# Patient Record
Sex: FEMALE | Race: OTHER | Employment: UNEMPLOYED | URBAN - METROPOLITAN AREA
[De-identification: names, ages, dates, MRNs, and addresses within clinical notes are randomized per-mention and may not be internally consistent; named-entity substitution may affect disease eponyms.]

---

## 2022-01-13 ENCOUNTER — PATIENT MESSAGE (OUTPATIENT)
Dept: INTERNAL MEDICINE CLINIC | Facility: CLINIC | Age: 52
End: 2022-01-13

## 2022-01-13 ENCOUNTER — TELEMEDICINE (OUTPATIENT)
Dept: INTERNAL MEDICINE CLINIC | Facility: CLINIC | Age: 52
End: 2022-01-13
Payer: COMMERCIAL

## 2022-01-13 DIAGNOSIS — B34.9 VIRAL SYNDROME: Primary | ICD-10-CM

## 2022-01-13 PROCEDURE — 99203 OFFICE O/P NEW LOW 30 MIN: CPT | Performed by: FAMILY MEDICINE

## 2022-01-13 NOTE — ASSESSMENT & PLAN NOTE
Symptoms consistent with a viral syndrome, possibly COVID. COVID precautions discussed. COVID test ordered. Work note provided. Stay well hydrated. Can try warm water with honey  Take tylenol or ibuprofen as needed. Can use lozenges as needed.   If w

## 2022-01-13 NOTE — PROGRESS NOTES
Telehealth outside of [address] Verbal Consent   I conducted a telehealth visit with Kan Lima today, 01/13/22, which was completed using two-way, real-time interactive audio and video communication.  This has been done in [place] to [employer] cold and flu  -has gotten 2 COVID vaccine      ROS  GENERAL: + fever/chills, no recent weight loss  HEENT: No visual changes, no changes in hearing, no sore throats  NECK: No pain, no swelling  RESP: +cough, no SOB  CV: No chest pain, no palpitations  GI: file  Housing Stability: Not on file    PAST SURGICAL HISTORY  History reviewed. No pertinent surgical history.     PAST FAMILY HISTORY  Family History   Problem Relation Age of Onset   • Dementia Mother    • Dementia Father          PHYSICAL EXAM  There we minutes  Referring/communicatin minutes  Care coordination: 0 minutes    My total time spent caring for the patient on the day of the encounter: 22 minutes

## 2022-01-14 ENCOUNTER — NURSE ONLY (OUTPATIENT)
Dept: LAB | Age: 52
End: 2022-01-14
Attending: FAMILY MEDICINE
Payer: COMMERCIAL

## 2022-01-14 DIAGNOSIS — B34.9 VIRAL SYNDROME: ICD-10-CM

## 2022-01-17 LAB — SARS-COV-2 RNA RESP QL NAA+PROBE: DETECTED

## 2022-01-25 ENCOUNTER — TELEMEDICINE (OUTPATIENT)
Dept: INTERNAL MEDICINE CLINIC | Facility: CLINIC | Age: 52
End: 2022-01-25

## 2022-01-25 ENCOUNTER — TELEPHONE (OUTPATIENT)
Dept: INTERNAL MEDICINE CLINIC | Facility: CLINIC | Age: 52
End: 2022-01-25

## 2022-01-25 DIAGNOSIS — U07.1 COVID: Primary | ICD-10-CM

## 2022-01-25 PROCEDURE — 99213 OFFICE O/P EST LOW 20 MIN: CPT | Performed by: FAMILY MEDICINE

## 2022-01-25 NOTE — ASSESSMENT & PLAN NOTE
Patient with recent diagnosis of COVID, overall feeling better. Today slightly more nauseated and with headache. Given recent diagnosis of COVID, can hold off on repeat COVID testing.   Has had over 10 days since start of COVID symptoms and in general is

## 2022-01-25 NOTE — PROGRESS NOTES
Telehealth outside of [address] Verbal Consent   I conducted a telehealth visit with Roland Muller today, 01/25/22, which was completed using two-way, real-time interactive audio and video communication.  This has been done in [place] to [employer] and flu  -has gotten 2 COVID vaccine    1/25/22  -COVID positive 1/14/22  -pt reports she is slightly nauseated today  -still slight cough  -slight sore throat   -slight headache  -reports that she is concerned  -reports that she went to work yesterday  -o activity: Not Currently        Partners: Male    Other Topics      Concerns:        Not on file    Social History Narrative      Not on file    Social Determinants of Health  Financial Resource Strain: Not on file  Food Insecurity: Not on file  Transportat honey  Take tylenol or ibuprofen as needed. Can use lozenges as needed. Follow up as needed. Return if symptoms worsen or fail to improve.  Patient with plans to establish care with a physician that her son also sees who is closer to her

## 2022-06-23 PROBLEM — F43.9 STRESS: Status: ACTIVE | Noted: 2022-06-23

## 2022-06-23 PROBLEM — F41.9 ANXIETY: Status: ACTIVE | Noted: 2022-06-23

## 2022-06-29 ENCOUNTER — TELEPHONE (OUTPATIENT)
Dept: FAMILY MEDICINE CLINIC | Facility: CLINIC | Age: 52
End: 2022-06-29

## 2022-06-29 NOTE — TELEPHONE ENCOUNTER
Patient is calling needs a work note for what medication she is on and the effects of the medication. She has two for her anxiety and she said one she can't drive for 8 hours. She would like a nurse to call her back.

## 2022-07-01 NOTE — TELEPHONE ENCOUNTER
Pt called again inquiring about the status of her forms for work. She also sent a [employer] message. Pt said she has been waiting to speak with a nurse for days.

## 2022-07-01 NOTE — TELEPHONE ENCOUNTER
Medication to treat anxiety SSRI such as escitalopram/Lexapro which patient is started will should not cause drowsiness. It will take a few weeks to get her to an effective dose. There is no rescue medication for anxiety that does not cause fatigue. Will sign [facility] paperwork for intermittent leave if needed.     Otherwise if patient can manage her anxiety with taking the Lexapro/escitalopram and go to work would be her best option    Please inform

## 2022-07-01 NOTE — TELEPHONE ENCOUNTER
Spoke to pt about paperwork. The paperwork is FMLA paperwork for pt to be on a continuous RACH. Pt does not want to take a continuous RACH, just wants to be able to be excused if she has to take time off work due to anxiety and so she can take hydroxyzine. She would not be able to perform job duties on the medication and is not supposed to drive for 8 hours after taking it due to sedating effects. Pt states this is a new job and she has not been there 90 days yet. She will discuss with her supervisor what documentation is needed and let us know. Let pt know we could draft a letter or maybe there is a form for intermittent FMLA she could have us fill out. Pt states her anxiety was increased so she took a hydroxyzine and called in to work. . She was experiencing sweaty palms, feeling nauseated  2-3 hours later after taking the hydroxyzine, she felt better     Pt would like to know if there is any \"rescue\" medication for anxiety that does not cause drowsiness and she could take and still be able to drive and perform work duties.

## 2022-07-06 ENCOUNTER — TELEPHONE (OUTPATIENT)
Dept: FAMILY MEDICINE CLINIC | Facility: CLINIC | Age: 52
End: 2022-07-06

## 2022-07-06 NOTE — TELEPHONE ENCOUNTER
Pt called office asking to speak to a nurse in regard to her paperwork. Pt states that she sent another msg to dr Beatrice Turpin on how to fill out her paperwork.  Pt will go into work at 215pm.

## 2022-07-06 NOTE — TELEPHONE ENCOUNTER
Spoke to pt my-chart message was received and reviewed. Forms pending review and signature of provider and aware  out of office until 07/18/2022.  Will notify pt once forms are completed, pt voiced understanding will need forms before 07/20/2022

## 2022-07-18 ENCOUNTER — TELEPHONE (OUTPATIENT)
Dept: FAMILY MEDICINE CLINIC | Facility: CLINIC | Age: 52
End: 2022-07-18

## 2022-07-18 NOTE — TELEPHONE ENCOUNTER
Paperwork placed in provider bin for review and signature, will call pt to inform once completed.  See [facility] routed to provider for notification

## 2022-07-18 NOTE — TELEPHONE ENCOUNTER
Patient called in f/u on paper work that is awaiting Doctors reviewing. Pt. States paper work is due on July 20th and will be available tomorrow to pick it up. Please f/u with pt.

## 2022-07-19 ENCOUNTER — MED REC SCAN ONLY (OUTPATIENT)
Dept: FAMILY MEDICINE CLINIC | Facility: CLINIC | Age: 52
End: 2022-07-19

## 2022-07-22 ENCOUNTER — OFFICE VISIT (OUTPATIENT)
Dept: FAMILY MEDICINE CLINIC | Facility: CLINIC | Age: 52
End: 2022-07-22
Payer: COMMERCIAL

## 2022-07-22 VITALS
BODY MASS INDEX: 26.03 KG/M2 | HEIGHT: 58 IN | DIASTOLIC BLOOD PRESSURE: 70 MMHG | HEART RATE: 65 BPM | RESPIRATION RATE: 16 BRPM | TEMPERATURE: 98 F | OXYGEN SATURATION: 99 % | WEIGHT: 124 LBS | SYSTOLIC BLOOD PRESSURE: 110 MMHG

## 2022-07-22 DIAGNOSIS — Z13.6 SCREENING, ISCHEMIC HEART DISEASE: ICD-10-CM

## 2022-07-22 DIAGNOSIS — Z13.0 SCREENING FOR ENDOCRINE, NUTRITIONAL, METABOLIC AND IMMUNITY DISORDER: ICD-10-CM

## 2022-07-22 DIAGNOSIS — Z23 NEED FOR VACCINATION: ICD-10-CM

## 2022-07-22 DIAGNOSIS — Z11.51 SCREENING FOR HUMAN PAPILLOMAVIRUS (HPV): ICD-10-CM

## 2022-07-22 DIAGNOSIS — Z86.32 HISTORY OF GESTATIONAL DIABETES: ICD-10-CM

## 2022-07-22 DIAGNOSIS — Z13.29 SCREENING FOR ENDOCRINE, NUTRITIONAL, METABOLIC AND IMMUNITY DISORDER: ICD-10-CM

## 2022-07-22 DIAGNOSIS — Z13.228 SCREENING FOR ENDOCRINE, NUTRITIONAL, METABOLIC AND IMMUNITY DISORDER: ICD-10-CM

## 2022-07-22 DIAGNOSIS — F41.9 ANXIETY: ICD-10-CM

## 2022-07-22 DIAGNOSIS — F43.9 STRESS: ICD-10-CM

## 2022-07-22 DIAGNOSIS — Z12.11 SCREEN FOR COLON CANCER: ICD-10-CM

## 2022-07-22 DIAGNOSIS — Z00.00 ANNUAL PHYSICAL EXAM: Primary | ICD-10-CM

## 2022-07-22 DIAGNOSIS — Z12.4 SCREENING FOR CERVICAL CANCER: ICD-10-CM

## 2022-07-22 DIAGNOSIS — Z13.21 SCREENING FOR ENDOCRINE, NUTRITIONAL, METABOLIC AND IMMUNITY DISORDER: ICD-10-CM

## 2022-07-22 PROBLEM — Z86.39 HISTORY OF HYPERTHYROIDISM: Status: ACTIVE | Noted: 2022-07-22

## 2022-07-22 PROCEDURE — 88175 CYTOPATH C/V AUTO FLUID REDO: CPT | Performed by: FAMILY MEDICINE

## 2022-07-22 PROCEDURE — 90471 IMMUNIZATION ADMIN: CPT | Performed by: FAMILY MEDICINE

## 2022-07-22 PROCEDURE — 90632 HEPA VACCINE ADULT IM: CPT | Performed by: FAMILY MEDICINE

## 2022-07-22 PROCEDURE — 99396 PREV VISIT EST AGE 40-64: CPT | Performed by: FAMILY MEDICINE

## 2022-07-22 PROCEDURE — 87624 HPV HI-RISK TYP POOLED RSLT: CPT | Performed by: FAMILY MEDICINE

## 2022-07-22 PROCEDURE — 3074F SYST BP LT 130 MM HG: CPT | Performed by: FAMILY MEDICINE

## 2022-07-22 PROCEDURE — 3078F DIAST BP <80 MM HG: CPT | Performed by: FAMILY MEDICINE

## 2022-07-22 PROCEDURE — 90715 TDAP VACCINE 7 YRS/> IM: CPT | Performed by: FAMILY MEDICINE

## 2022-07-22 PROCEDURE — 3008F BODY MASS INDEX DOCD: CPT | Performed by: FAMILY MEDICINE

## 2022-07-22 PROCEDURE — 90472 IMMUNIZATION ADMIN EACH ADD: CPT | Performed by: FAMILY MEDICINE

## 2022-07-23 DIAGNOSIS — F41.9 ANXIETY: ICD-10-CM

## 2022-07-25 DIAGNOSIS — F41.9 ANXIETY: ICD-10-CM

## 2022-07-25 LAB — HPV I/H RISK 1 DNA SPEC QL NAA+PROBE: NEGATIVE

## 2022-07-26 RX ORDER — ESCITALOPRAM OXALATE 10 MG/1
TABLET ORAL
Qty: 30 TABLET | Refills: 0 | OUTPATIENT
Start: 2022-07-26

## 2022-07-26 RX ORDER — ESCITALOPRAM OXALATE 10 MG/1
TABLET ORAL
Qty: 90 TABLET | Refills: 1 | Status: SHIPPED | OUTPATIENT
Start: 2022-07-26 | End: 2022-08-03

## 2022-08-01 ENCOUNTER — PATIENT MESSAGE (OUTPATIENT)
Dept: FAMILY MEDICINE CLINIC | Facility: CLINIC | Age: 52
End: 2022-08-01

## 2022-08-01 NOTE — TELEPHONE ENCOUNTER
From: Jenny Rodney  To: Calvin Rodriguez DO  Sent: 8/1/2022 12:17 PM CDT  Subject: anxiety     Hello Dr. Jeffery Mcclain  I'm feeling allot of anxiety today. I feel the need to take my other pill. It's different this time it's like it's creeping up on me. Nothing is upsetting me no issues today. I just wanted to let you know. I'm going to stay home from work today and take my pill. I hope this is just my body adjusting to the medicine. And it also just started a few minutes ago I was fine and then it was out of the blue. I know the medicine is helping I just don't want to half to take the other one. But today I feel it. Thank you for your time.

## 2022-08-01 NOTE — TELEPHONE ENCOUNTER
Patient may need her escitalopram/Lexapro increased to 20 mg which she can discuss this at the upcoming office visit with Dr. Luisito Flores.  It is fine if she takes the hydroxyzine. Anxiety can occur out of the blue even if there is no precipitating event.     Please inform

## 2022-08-03 ENCOUNTER — PATIENT MESSAGE (OUTPATIENT)
Dept: FAMILY MEDICINE CLINIC | Facility: CLINIC | Age: 52
End: 2022-08-03

## 2022-08-03 PROCEDURE — 84439 ASSAY OF FREE THYROXINE: CPT | Performed by: FAMILY MEDICINE

## 2022-08-03 PROCEDURE — 80050 GENERAL HEALTH PANEL: CPT | Performed by: FAMILY MEDICINE

## 2022-08-03 PROCEDURE — 80061 LIPID PANEL: CPT | Performed by: FAMILY MEDICINE

## 2022-08-03 PROCEDURE — 83036 HEMOGLOBIN GLYCOSYLATED A1C: CPT | Performed by: FAMILY MEDICINE

## 2022-08-03 NOTE — PROGRESS NOTES
Patient came in for draw of ordered fasting labs. Patient drawn out of right AC, x 1 attempt and tolerated well. 1 lav 1 gold (labeled ltgrn)  tube drawn.

## 2022-08-03 NOTE — TELEPHONE ENCOUNTER
From: Madonna Jean  To: Pratibha Mujica DO  Sent: 8/3/2022 9:08 AM CDT  Subject: Shingles shot     I sent a message accident for [address] [place] [address] regarding shingles shot I meant it to be for Madonna Jean. I would like to get this done since I'm scheduled off of work tomorrow.      Thank you,  Madonna Jean

## 2022-08-04 ENCOUNTER — TELEPHONE (OUTPATIENT)
Dept: FAMILY MEDICINE CLINIC | Facility: CLINIC | Age: 52
End: 2022-08-04

## 2022-08-04 DIAGNOSIS — E03.9 ACQUIRED HYPOTHYROIDISM: ICD-10-CM

## 2022-08-04 RX ORDER — LEVOTHYROXINE SODIUM 0.07 MG/1
75 TABLET ORAL
Qty: 90 TABLET | Refills: 0 | Status: SHIPPED | OUTPATIENT
Start: 2022-08-04 | End: 2023-07-30

## 2022-08-04 NOTE — TELEPHONE ENCOUNTER
Pt completed TSH labs yesterday and new prescription for levothyroxine 100 mcg sent to pharmacy    Dose is 100 mcg and instructions state to take 75 mcg daily    Please clarify dosage for pharmacy unable to split . 75 of tablet.

## 2022-08-05 NOTE — TELEPHONE ENCOUNTER
Sent corrected dose to pharmacy. Rx levothyroxine 75 mg 1 tab p.o. 30 minutes prior to breakfast.  Take on an empty stomach and avoid other medications.   Closing encounter

## 2022-08-17 DIAGNOSIS — F41.9 ANXIETY: ICD-10-CM

## 2022-08-18 ENCOUNTER — PATIENT MESSAGE (OUTPATIENT)
Dept: FAMILY MEDICINE CLINIC | Facility: CLINIC | Age: 52
End: 2022-08-18

## 2022-08-18 DIAGNOSIS — F41.9 ANXIETY: ICD-10-CM

## 2022-08-18 RX ORDER — ESCITALOPRAM OXALATE 20 MG/1
20 TABLET ORAL EVERY MORNING
Qty: 90 TABLET | Refills: 0 | OUTPATIENT
Start: 2022-08-18

## 2022-08-31 RX ORDER — ESCITALOPRAM OXALATE 20 MG/1
20 TABLET ORAL EVERY MORNING
Qty: 90 TABLET | Refills: 0 | Status: SHIPPED | OUTPATIENT
Start: 2022-08-31

## 2022-08-31 NOTE — TELEPHONE ENCOUNTER
TYRA Terrell 8/30/2022 8:44 AM CDT      ----- Message -----  From: Twin Ludwig  Sent: 8/26/2022 9:17 AM CDT  To: [facility]  Subject: Refill for Escitalopram     I just called the pharmacy and they only show this prescription as 10mg they don't have anything for 20mg. I asked them to fill the 10 if you could call them and see if they can change it to 20mg. Otherwise I will have to take 2 pills to equal the 20mg.        Thank you,  Twin Ludwig

## 2022-08-31 NOTE — TELEPHONE ENCOUNTER
Outreach call to pharmacy spoke to [facility], states they did not receive Rx for escitalopram 20 mg sent 08/03/2022.  Last Rx received was 07/26/2022 for escitalopram  10 Mg     rx pended for signature if ok

## 2022-09-15 ENCOUNTER — OFFICE VISIT (OUTPATIENT)
Dept: FAMILY MEDICINE CLINIC | Facility: CLINIC | Age: 52
End: 2022-09-15
Payer: COMMERCIAL

## 2022-09-15 VITALS
OXYGEN SATURATION: 97 % | WEIGHT: 124.63 LBS | BODY MASS INDEX: 26.16 KG/M2 | RESPIRATION RATE: 16 BRPM | SYSTOLIC BLOOD PRESSURE: 118 MMHG | HEART RATE: 73 BPM | DIASTOLIC BLOOD PRESSURE: 70 MMHG | HEIGHT: 58 IN | TEMPERATURE: 98 F

## 2022-09-15 DIAGNOSIS — R73.03 PREDIABETES: ICD-10-CM

## 2022-09-15 DIAGNOSIS — E03.9 ACQUIRED HYPOTHYROIDISM: Primary | ICD-10-CM

## 2022-09-15 DIAGNOSIS — E78.2 MIXED HYPERLIPIDEMIA: ICD-10-CM

## 2022-09-15 LAB
T4 FREE SERPL-MCNC: 1.2 NG/DL (ref 0.8–1.7)
THYROGLOB SERPL-MCNC: <15 U/ML (ref ?–60)
THYROPEROXIDASE AB SERPL-ACNC: ABNORMAL U/ML (ref ?–60)
TSI SER-ACNC: 1.45 MIU/ML (ref 0.36–3.74)

## 2022-09-15 PROCEDURE — 36415 COLL VENOUS BLD VENIPUNCTURE: CPT | Performed by: FAMILY MEDICINE

## 2022-09-15 PROCEDURE — 84443 ASSAY THYROID STIM HORMONE: CPT | Performed by: FAMILY MEDICINE

## 2022-09-15 PROCEDURE — 3008F BODY MASS INDEX DOCD: CPT | Performed by: FAMILY MEDICINE

## 2022-09-15 PROCEDURE — 86376 MICROSOMAL ANTIBODY EACH: CPT | Performed by: FAMILY MEDICINE

## 2022-09-15 PROCEDURE — 84439 ASSAY OF FREE THYROXINE: CPT | Performed by: FAMILY MEDICINE

## 2022-09-15 PROCEDURE — 99214 OFFICE O/P EST MOD 30 MIN: CPT | Performed by: FAMILY MEDICINE

## 2022-09-15 PROCEDURE — 86800 THYROGLOBULIN ANTIBODY: CPT | Performed by: FAMILY MEDICINE

## 2022-09-15 PROCEDURE — 3078F DIAST BP <80 MM HG: CPT | Performed by: FAMILY MEDICINE

## 2022-09-15 PROCEDURE — 3074F SYST BP LT 130 MM HG: CPT | Performed by: FAMILY MEDICINE

## 2022-09-15 NOTE — PROGRESS NOTES
Patient came in for draw of ordered fasting labs. Patient drawn out of right AC, x 1 attempt and tolerated well. 1 lav 1 ltgrn  tube drawn.

## 2022-09-26 ENCOUNTER — TELEPHONE (OUTPATIENT)
Dept: FAMILY MEDICINE CLINIC | Facility: CLINIC | Age: 52
End: 2022-09-26

## 2022-09-26 NOTE — TELEPHONE ENCOUNTER
Pt called office asking to speak to a nurse in regard to a cough,sore throat and dizziness that she is having. COVID test was taken and came back negative.

## 2022-09-26 NOTE — TELEPHONE ENCOUNTER
Pt with complaints of congestion and cough and sore throat  that started last week Tuesday On Wednesday pt took a Covid test and was negative. Today she continues to have congestion and cough, States her sore threat has resolved but does feel some rib pain when coughing. Denies and SOB or difficulty breathing. States she has been taking Robitussin but has not noted improvement.    Pt scheduled for virtual visit 09/27/2022

## 2022-09-27 ENCOUNTER — TELEMEDICINE (OUTPATIENT)
Dept: FAMILY MEDICINE CLINIC | Facility: CLINIC | Age: 52
End: 2022-09-27

## 2022-09-27 DIAGNOSIS — Z87.891 HISTORY OF SMOKING: ICD-10-CM

## 2022-09-27 DIAGNOSIS — Z86.59 HISTORY OF ANXIETY: ICD-10-CM

## 2022-09-27 DIAGNOSIS — J00 ACUTE NASOPHARYNGITIS: Primary | ICD-10-CM

## 2022-09-27 DIAGNOSIS — Z87.01 HISTORY OF PNEUMONIA: ICD-10-CM

## 2022-09-27 DIAGNOSIS — R07.9 RIGHT-SIDED CHEST PAIN: ICD-10-CM

## 2022-09-27 DIAGNOSIS — E03.9 ACQUIRED HYPOTHYROIDISM: ICD-10-CM

## 2022-09-27 PROCEDURE — 99214 OFFICE O/P EST MOD 30 MIN: CPT | Performed by: FAMILY MEDICINE

## 2022-09-27 RX ORDER — AZITHROMYCIN 250 MG/1
TABLET, FILM COATED ORAL
Qty: 6 TABLET | Refills: 0 | Status: SHIPPED | OUTPATIENT
Start: 2022-09-27 | End: 2022-10-02

## 2022-09-27 RX ORDER — GUAIFENESIN AND DEXTROMETHORPHAN HYDROBROMIDE 1200; 60 MG/1; MG/1
1 TABLET, EXTENDED RELEASE ORAL EVERY 12 HOURS
Qty: 1 TABLET | Refills: 0 | Status: SHIPPED | OUTPATIENT
Start: 2022-09-27 | End: 2022-10-07

## 2022-09-27 RX ORDER — LEVOTHYROXINE SODIUM 0.07 MG/1
75 TABLET ORAL
Qty: 90 TABLET | Refills: 2 | Status: SHIPPED | OUTPATIENT
Start: 2022-09-27 | End: 2023-09-22

## 2022-09-28 ENCOUNTER — HOSPITAL ENCOUNTER (OUTPATIENT)
Dept: GENERAL RADIOLOGY | Age: 52
Discharge: HOME | End: 2022-09-28
Attending: FAMILY MEDICINE
Payer: COMMERCIAL

## 2022-09-28 DIAGNOSIS — R07.9 RIGHT-SIDED CHEST PAIN: ICD-10-CM

## 2022-09-28 DIAGNOSIS — J00 ACUTE NASOPHARYNGITIS: ICD-10-CM

## 2022-09-28 DIAGNOSIS — Z87.891 HISTORY OF SMOKING: ICD-10-CM

## 2022-09-28 DIAGNOSIS — Z87.01 HISTORY OF PNEUMONIA: ICD-10-CM

## 2022-09-28 PROCEDURE — 71046 X-RAY EXAM CHEST 2 VIEWS: CPT | Performed by: FAMILY MEDICINE

## 2022-10-03 ENCOUNTER — PATIENT MESSAGE (OUTPATIENT)
Dept: FAMILY MEDICINE CLINIC | Facility: CLINIC | Age: 52
End: 2022-10-03

## 2022-10-04 ENCOUNTER — TELEPHONE (OUTPATIENT)
Dept: FAMILY MEDICINE CLINIC | Facility: CLINIC | Age: 52
End: 2022-10-04

## 2022-10-04 NOTE — TELEPHONE ENCOUNTER
Pt scheduled for Rn visit 10/05/2022 for shingrix vaccine second dose,  First dose given 08/03/2022  Order pended sent to provider for review

## 2022-10-05 ENCOUNTER — NURSE ONLY (OUTPATIENT)
Dept: FAMILY MEDICINE CLINIC | Facility: CLINIC | Age: 52
End: 2022-10-05
Payer: COMMERCIAL

## 2022-10-05 PROCEDURE — 90471 IMMUNIZATION ADMIN: CPT | Performed by: FAMILY MEDICINE

## 2022-10-05 PROCEDURE — 90750 HZV VACC RECOMBINANT IM: CPT | Performed by: FAMILY MEDICINE

## 2022-10-05 NOTE — PROGRESS NOTES
Pt here for second dose of Shingrix, VIS sheet pr and pt VU, verbal consent given for vaccine.   Vaccine administered per protocol and pt tolerated well

## 2022-10-05 NOTE — TELEPHONE ENCOUNTER
From: Lucy Mckoy  To: Mone Sun DO  Sent: 10/3/2022 7:54 AM CDT  Subject: Letter of excuse     Hi Dr. Davidson Gil    I wanted to know if you could send me a new letter from last week I didnt go in on Friday and Saturday because I still wasn't feeling well. If you can't then that's fine I completely understand. Thank you so much. Sincerely.   Lucy Mckoy

## 2022-10-05 NOTE — TELEPHONE ENCOUNTER
Note pended to excuse pt from work thru 10/03/2022, sent to provider for review and signature ,DirectAddress_Unknown

## 2023-02-01 DIAGNOSIS — E03.9 ACQUIRED HYPOTHYROIDISM: ICD-10-CM

## 2023-02-01 RX ORDER — LEVOTHYROXINE SODIUM 0.07 MG/1
75 TABLET ORAL
Qty: 90 TABLET | Refills: 2 | Status: SHIPPED | OUTPATIENT
Start: 2023-02-01 | End: 2024-01-27

## 2023-04-24 ENCOUNTER — PATIENT MESSAGE (OUTPATIENT)
Dept: FAMILY MEDICINE CLINIC | Facility: CLINIC | Age: 53
End: 2023-04-24

## 2023-04-24 DIAGNOSIS — F41.9 ANXIETY: ICD-10-CM

## 2023-04-24 RX ORDER — HYDROXYZINE HYDROCHLORIDE 25 MG/1
25 TABLET, FILM COATED ORAL EVERY 8 HOURS PRN
Qty: 60 TABLET | Refills: 2 | Status: SHIPPED | OUTPATIENT
Start: 2023-04-24

## 2023-04-24 NOTE — TELEPHONE ENCOUNTER
From: Jada Tabares  To: Irish Rangel DO  Sent: 4/24/2023 9:45 AM CDT  Subject: Office visit and other procedures     Dr. Bella Drummond, I'm sending you a message, because I no longer have insurance coverage. I will be on my 's but not until December of this year. We were not informed that we had 30 days to add me to his insurance after we were , so we have to wait until insurance renewal in November. So I wanted to let you know this so all my scheduled appointments and test can be canceled until I update my insurance information. I did want to make sure I could still have my thyroid medication continue until then. I have also noticed that at times the hydroxyzine that you prescribed to me helps when I can't relax at night. If you could still give me that medication as well. If that one can not be refilled without an office visit then I understand. I can wait till December to talk to you about that one. I will have insurance again it's just a matter of waiting till November now.  Thank you, sincerely, Jada Tabares

## 2023-04-25 NOTE — TELEPHONE ENCOUNTER
Sent in hydroxyzine Rx for patient  Levothyroxine 75 mcg daily #90 with 2 refills should last her until September. In September she is due for annual lab work. There is walk-in labs. [facility] and patient can pick [facility] and order a TSH and free T4 pay 20 $30 cash and drop off the results and I will give her another 90 days or a year of medication in September but wait until August to call for the refill. Just have her remind me that she is without insurance and were bridging her care.     Please inform

## 2023-06-05 ENCOUNTER — PATIENT MESSAGE (OUTPATIENT)
Dept: FAMILY MEDICINE CLINIC | Facility: CLINIC | Age: 53
End: 2023-06-05

## 2023-06-05 DIAGNOSIS — F41.9 ANXIETY: ICD-10-CM

## 2023-06-05 NOTE — TELEPHONE ENCOUNTER
Please see my chart message. Pt is requesting medication refill of escitalopram 20 mg. Order pended for provider review and signature.

## 2023-06-05 NOTE — TELEPHONE ENCOUNTER
From: Kan Lima  To: Donna Solo DO  Sent: 6/5/2023 12:40 PM CDT  Subject: Lexapro 20 mg    Started taking on June 1, 2022  Comments: Jesus Contreras I noticed that this isnt listed as medication I take. Is it possible to have this medication refilled? I don't have insurance as of yet. But is it possible to have this medication prescribed to me?  Thank you

## 2023-06-07 RX ORDER — ESCITALOPRAM OXALATE 20 MG/1
20 TABLET ORAL EVERY MORNING
Qty: 90 TABLET | Refills: 0 | Status: SHIPPED | OUTPATIENT
Start: 2023-06-07

## 2023-06-08 NOTE — TELEPHONE ENCOUNTER
Patient without insurance. Sent in 90-day of escitalopram Lexapro for patient to [facility]. Recommend using good Rx. If [facility] will not use a coupon to help with cost then we can send it to [facility] or [facility] who will typically accept the coupons. Patient will need to be seen annually for any further refills. If she does not have insurance by September, I would recommend bridging [facility] care at the [facility] which would be no cost to her. She can always return back to the office once or once her insurance is reinstated or she can pay cash to see me which is unfortunately expensive.   Please inform    [facility] -free or sliding scale care  [phone]  [facility], [place]

## 2023-08-08 ENCOUNTER — PATIENT MESSAGE (OUTPATIENT)
Dept: FAMILY MEDICINE CLINIC | Facility: CLINIC | Age: 53
End: 2023-08-08

## 2024-03-21 PROBLEM — J00 ACUTE NASOPHARYNGITIS: Status: RESOLVED | Noted: 2022-09-27 | Resolved: 2024-03-21

## 2024-04-18 ENCOUNTER — OFFICE VISIT (OUTPATIENT)
Dept: FAMILY MEDICINE CLINIC | Facility: CLINIC | Age: 54
End: 2024-04-18
Payer: COMMERCIAL

## 2024-04-18 VITALS
HEART RATE: 91 BPM | RESPIRATION RATE: 16 BRPM | BODY MASS INDEX: 28.17 KG/M2 | DIASTOLIC BLOOD PRESSURE: 70 MMHG | WEIGHT: 134.19 LBS | SYSTOLIC BLOOD PRESSURE: 114 MMHG | HEIGHT: 58 IN | TEMPERATURE: 98 F | OXYGEN SATURATION: 97 %

## 2024-04-18 DIAGNOSIS — Z12.31 ENCOUNTER FOR SCREENING MAMMOGRAM FOR MALIGNANT NEOPLASM OF BREAST: ICD-10-CM

## 2024-04-18 DIAGNOSIS — Z13.21 SCREENING FOR ENDOCRINE, NUTRITIONAL, METABOLIC AND IMMUNITY DISORDER: ICD-10-CM

## 2024-04-18 DIAGNOSIS — Z13.0 SCREENING FOR IRON DEFICIENCY ANEMIA: ICD-10-CM

## 2024-04-18 DIAGNOSIS — Z12.11 SCREENING FOR COLON CANCER: ICD-10-CM

## 2024-04-18 DIAGNOSIS — Z13.0 SCREENING FOR ENDOCRINE, NUTRITIONAL, METABOLIC AND IMMUNITY DISORDER: ICD-10-CM

## 2024-04-18 DIAGNOSIS — B35.3 TINEA PEDIS OF BOTH FEET: ICD-10-CM

## 2024-04-18 DIAGNOSIS — E03.9 ACQUIRED HYPOTHYROIDISM: ICD-10-CM

## 2024-04-18 DIAGNOSIS — Z13.228 SCREENING FOR ENDOCRINE, NUTRITIONAL, METABOLIC AND IMMUNITY DISORDER: ICD-10-CM

## 2024-04-18 DIAGNOSIS — R73.03 PREDIABETES: ICD-10-CM

## 2024-04-18 DIAGNOSIS — Z00.00 ANNUAL PHYSICAL EXAM: Primary | ICD-10-CM

## 2024-04-18 DIAGNOSIS — L29.9 PRURITUS: ICD-10-CM

## 2024-04-18 DIAGNOSIS — Z13.29 SCREENING FOR ENDOCRINE, NUTRITIONAL, METABOLIC AND IMMUNITY DISORDER: ICD-10-CM

## 2024-04-18 DIAGNOSIS — Z23 NEED FOR VACCINATION: ICD-10-CM

## 2024-04-18 DIAGNOSIS — E78.2 MIXED HYPERLIPIDEMIA: ICD-10-CM

## 2024-04-18 PROBLEM — F43.9 STRESS: Status: RESOLVED | Noted: 2022-06-23 | Resolved: 2024-04-18

## 2024-04-18 PROBLEM — U07.1 COVID: Status: RESOLVED | Noted: 2022-01-13 | Resolved: 2024-04-18

## 2024-04-18 PROBLEM — R07.9 RIGHT-SIDED CHEST PAIN: Status: RESOLVED | Noted: 2022-09-27 | Resolved: 2024-04-18

## 2024-04-18 PROCEDURE — 99396 PREV VISIT EST AGE 40-64: CPT | Performed by: FAMILY MEDICINE

## 2024-04-18 PROCEDURE — 90472 IMMUNIZATION ADMIN EACH ADD: CPT | Performed by: FAMILY MEDICINE

## 2024-04-18 PROCEDURE — 90636 HEP A/HEP B VACC ADULT IM: CPT | Performed by: FAMILY MEDICINE

## 2024-04-18 PROCEDURE — 90707 MMR VACCINE SC: CPT | Performed by: FAMILY MEDICINE

## 2024-04-18 PROCEDURE — 90471 IMMUNIZATION ADMIN: CPT | Performed by: FAMILY MEDICINE

## 2024-04-18 RX ORDER — LEVOTHYROXINE SODIUM 0.07 MG/1
75 TABLET ORAL
Qty: 34 TABLET | Refills: 10 | Status: SHIPPED | OUTPATIENT
Start: 2024-04-18 | End: 2025-04-13

## 2024-04-18 RX ORDER — KETOCONAZOLE 20 MG/G
1 CREAM TOPICAL 2 TIMES DAILY
Qty: 60 G | Refills: 1 | Status: SHIPPED | OUTPATIENT
Start: 2024-04-18 | End: 2024-05-09

## 2024-04-18 RX ORDER — CETIRIZINE HYDROCHLORIDE 10 MG/1
10 TABLET ORAL DAILY
Qty: 90 TABLET | Refills: 3 | Status: SHIPPED | OUTPATIENT
Start: 2024-04-18

## 2024-04-18 NOTE — PROGRESS NOTES
REASON FOR VISIT:    Mely Rome is a 53 year old female who presents for an Annual Health Assessment.    Pt had a history of hypothyroidism and here to recheck. Has been tolerating the medication well. Admits missing doses frequently and sometimes takes at night  Last TSH-  TSH (mIU/mL)   Date Value   09/15/2022 1.450   2022 38.500 (H)   needs repeat. Denies any shakiness, palpitations, increased BM's or wgt loss. Denies any fatigue, wgt gain.    Complains of athletes feet left foot very itchy dry like treatment.  Denies any bleeding.  Has not tried anything OTC    Complains of frequent pruritus itching arms legs trunk throughout the day.  Will have some dermatographia some.  Occasionally scratches a bump will appear.  Uncertain if has hives.  Denies having hive appearing raised rash prior to itching.  States skin from with completely normal but cannot stop scratching.    , monogamous    menses: menopausal age 49, denies vaginal bleeding  Birth control: N/a  Last pap: 2022 normal and negative HPV   History of abnormal pap: Denies  On vit D daily    MVI-yes daily with vitamin D-no  Calcium- No medications  Colonoscopy- no- denies family history of colon cancer  Mammogram- overdue greater than 10 years-scheduled 2022 at 11:20 AM  Dexa  Exercise - no  Diet-states relatively healthy consumes meat, some vegetables  Dentist regularly-yes  Annual eye exam-wearing readers, no  Etoh: 0-3  drinks per month  Cigs: former smoker quit 2021  Illicits: Denies, no marijuana  Immunizations: needs Hep A and B and MMR, and covid 19 booster  FH significant: reviewed. Denies colon, breast, ovarian/cervical cancer, diabetes, MI, HTN  Family History   Problem Relation Age of Onset    Mental Disorder Mother     Psychiatric Mother         Bipolar and schezophrenia    Dementia Father     No Known Problems Daughter     No Known Problems Son     No Known Problems Maternal Grandmother     No Known Problems  Maternal Grandfather     Breast Cancer Paternal Grandmother     No Known Problems Paternal Grandfather     No Known Problems Son     No Known Problems Son     No Known Problems Son          Patient Active Problem List   Diagnosis    COVID    Anxiety    Stress    History of gestational diabetes    BMI 25.0-25.9,adult    History of hyperthyroidism    Acquired hypothyroidism    Mixed hyperlipidemia    Prediabetes    History of anxiety    Right-sided chest pain    History of smoking    History of pneumonia     Current Outpatient Medications   Medication Sig Dispense Refill    hydrOXYzine 25 MG Oral Tab Take 1 tablet (25 mg total) by mouth every 8 (eight) hours as needed for Anxiety (causes sedation. no driving for 8 hr after ingestion). 60 tablet 2     Wt Readings from Last 6 Encounters:   04/18/24 134 lb 3.2 oz (60.9 kg)   09/15/22 124 lb 9.6 oz (56.5 kg)   08/03/22 125 lb 6.4 oz (56.9 kg)   07/22/22 124 lb (56.2 kg)   06/23/22 125 lb (56.7 kg)     Body mass index is 28.05 kg/m².    No results found for: \"GLUCOSE\"  Lab Results   Component Value Date    CHOLEST 248 (H) 08/03/2022     Lab Results   Component Value Date    HDL 65 (H) 08/03/2022     No results found for: \"TRIGLY\"  Lab Results   Component Value Date     (H) 08/03/2022     Lab Results   Component Value Date    AST 17 08/03/2022     Lab Results   Component Value Date    ALT 31 08/03/2022     Lab Results   Component Value Date    TSH 1.450 09/15/2022    TSH 38.500 (H) 08/03/2022     Lab Results   Component Value Date    BUN 10 08/03/2022    CREATSERUM 1.04 (H) 08/03/2022       General Health     How would you describe your current health state?: Good    Type of Diet: Other    How do you maintain positive mental well-being?: Visiting Family;Visiting Friends;Games;Social Interaction;Puzzles    How would you describe your daily physical activity?: Moderate    If you are a male age 45-79 or a female age 55-79, do you take aspirin?: No    Have you had any  immunizations at another office such as Influenza, Hepatitis B, Tetanus, or Pneumococcal?: No    At any time do you feel concerned for the safety/well-being of yourself and/or your children, in your home or elsewhere?: No     CAGE:     Cut: Have you ever felt you should Cut down on your drinking?: No    Annoyed: Have people Annoyed you by criticizing your drinking?: No    Guilty: Have you ever felt bad or Guilty about your drinking?: No    Eye Opener: Have you ever had a drink first thing in the morning to steady your nerves or to get rid of a hangover (Eye opener)?: No    Scoring  Total Score: 0     Depression Screening (PHQ-2/PHQ-9): Over the LAST 2 WEEKS   Little interest or pleasure in doing things (over the last two weeks)?: Not at all    Feeling down, depressed, or hopeless (over the last two weeks)?: Not at all    PHQ-2 SCORE: 0        PREVENTATIVE SERVICES  INDICATIONS AND SCHEDULE Recommendation Internal Lab or Procedure External Lab or Procedure   Breast Cancer Screening   Every 2 yrs age 50-74 Health Maintenance   Topic Date Due    Mammogram  Never done       Pap Every 3 yrs age 21-65 or Pap and HPV every 5 yrs age 30-65 Health Maintenance   Topic Date Due    Pap Smear  07/22/2025       Chlamydia Screening Screen Annually age<25, if sex active/on OCPs; >24 high risk No results found for: \"CHLAMYDIA\"    Colonoscopy Screen Every 10 years Health Maintenance   Topic Date Due    Colorectal Cancer Screening  Never done       Flex Sigmoidoscopy Screen  Every 5 years No results found for this or any previous visit.    Fecal Occult Blood  Annually No results found for: \"FOB\", \"OCCULTSTOOL\"    Obesity Screening Screen all adults annually Body mass index is 28.05 kg/m².      Preventive Services for Which Recommendations Vary with Risk Recommendation Internal Lab or Procedure External Lab or Procedure   Cholesterol Screening Recommended screening varies with age, risk and gender LDL Cholesterol (mg/dL)   Date Value    08/03/2022 134 (H)       Diabetes Screening  if history of high blood pressure or other  risk factors HgbA1C (%)   Date Value   08/03/2022 6.1 (H)     Glucose (mg/dL)   Date Value   08/03/2022 93         Gonorrhea Screening if high risk No results found for: \"GONOCOCCUS\"    HIV Screening For all adults age 18-65, older adults at increased risk No results found for: \"HIV\"    Syphilis Screening Screen if pregnant or high risk No results found for: \"RPR\"    Hepatitis C Screening Screen those at high risk plus screen one time for adults born 1946-1 965 No results found for: \"HCVAB\"    Tuberculosis Screen if high risk No components found for: \"PPDINDURAT\"      Disease Monitoring:    SPECIFIC DISEASE MONITORING Internal Lab or Procedure External Lab or Procedure   Annual Monitoring of Persistent     Medications (ACE/ARB, digoxin, diuretics)    Potassium  Annually Potassium (mmol/L)   Date Value   08/03/2022 4.8         No data to display                Creatinine  Annually Creatinine (mg/dL)   Date Value   08/03/2022 1.04 (H)         No data to display                Digoxin Serum Conc  Annually No results found for: \"DIGOXIN\"      No data to display                Diabetes      HgbA1C  Annually HgbA1C (%)   Date Value   08/03/2022 6.1 (H)         No data to display                Creat/alb ratio  Annually Creatinine (mg/dL)   Date Value   08/03/2022 1.04 (H)        LDL  Annually LDL Cholesterol (mg/dL)   Date Value   08/03/2022 134 (H)         No data to display                 Dilated Eye exam  Annually      No data to display                   No data to display                Asthma  (Annually between Nov. 1 & Dec. 31)    Date of last AAP/ACT and counseling given on importance of controller meds.                 ALLERGIES:   No Known Allergies    CURRENT MEDICATIONS:   Current Outpatient Medications   Medication Sig Dispense Refill    hydrOXYzine 25 MG Oral Tab Take 1 tablet (25 mg total) by mouth every 8 (eight)  hours as needed for Anxiety (causes sedation. no driving for 8 hr after ingestion). 60 tablet 2      MEDICAL INFORMATION:   Past Medical History:    Anxiety    Depression    Hyperthyroidism      Past Surgical History:   [facility]    [facility]       Family History   Problem Relation Age of Onset    Mental Disorder Mother     Psychiatric Mother         Bipolar and schezophrenia    Dementia Father     No Known Problems Daughter     No Known Problems Son     No Known Problems Maternal Grandmother     No Known Problems Maternal Grandfather     Breast Cancer Paternal Grandmother     No Known Problems Paternal Grandfather     No Known Problems Son     No Known Problems Son     No Known Problems Son       SOCIAL HISTORY:   Social History     Socioeconomic History    Marital status: Single   Tobacco Use    Smoking status: Former     Current packs/day: 0.00     Average packs/day: 1 pack/day for 23.0 years (23.0 ttl pk-yrs)     Types: Cigarettes     Start date: 1998     Quit date: 2021     Years since quittin.6    Smokeless tobacco: Never   Vaping Use    Vaping status: Never Used   Substance and Sexual Activity    Alcohol use: Not Currently     Alcohol/week: 0.0 standard drinks of alcohol     Comment: Casual drinker    Drug use: Never    Sexual activity: Not Currently     Partners: Male   Other Topics Concern    Caffeine Concern No    Exercise No    Seat Belt No    Special Diet No    Stress Concern Yes     Comment: Slightly not extreme    Weight Concern No     Occ:     : Life partner       REVIEW OF SYSTEMS:   GENERAL: feels well otherwise  SKIN: denies any unusual skin lesions  EYES: denies blurred vision or double vision  HEENT: denies nasal congestion, sinus pain or ST  LUNGS: denies shortness of breath with exertion  CARDIOVASCULAR: denies chest pain on exertion  GI: denies abdominal pain, denies heartburn  : denies dysuria, vaginal discharge or itching, menopausal,  denies vaginal bleeding  MUSCULOSKELETAL: denies back pain  NEURO: denies headaches  PSYCHE: denies depression or anxiety  HEMATOLOGIC: denies hx of anemia  ENDOCRINE: denies thyroid history  ALL/ASTHMA: denies hx of allergy or asthma    EXAM:   /70 (BP Location: Left arm, Patient Position: Sitting, Cuff Size: adult)   Pulse 91   Temp 98.1 °F (36.7 °C) (Temporal)   Resp 16   Ht 4' 10\" (1.473 m)   Wt 134 lb 3.2 oz (60.9 kg)   SpO2 97%   BMI 28.05 kg/m²    No LMP recorded. (Menstrual status: Menopause).   GENERAL: well developed, well nourished, in no apparent distress  SKIN: no rashes-except right foot is exfoliation and thickening of the skin consistent with tenia penis no pus or redness or drainage, no suspicious lesions  HEENT: atraumatic, normocephalic, ears and throat are clear  EYES:PERRLA, EOMI, normal optic disk, conjunctiva are clear  NECK: supple, no adenopathy, no bruits  CHEST: no chest tenderness  BREAST: no dominant or suspicious mass bilateral, no axillary masses, normal  LUNGS: clear to auscultation  CARDIO: RRR without murmur  GI: good BS's, no masses, HSM or tenderness  : introitus is normal, scant discharge, cervix is pink, no adnexal masses or tenderness,   RECTAL: good rectal tone  MUSCULOSKELETAL: back is not tender, FROM of the back  EXTREMITIES: no cyanosis, clubbing or edema  NEURO: Oriented times three, cranial nerves are intact, motor and sensory are grossly intact    ASSESSMENT AND OTHER RELEVANT CHRONIC CONDITIONS:   Mely Rome is a 53 year old female who presents for an Annual Health Assessment.     PLAN SUMMARY:     1. Annual physical exam  -Encourage Mediterranean diet  -Encouraged exercise 30 minutes to 60 minutes daily x 3-5x weekly for 150 minutes or more to prevent obesity and chronic disease and eliminate stress and its effect on the body.  -encouraged to continue not smoking  -safe sex practices - recommend condom use  -mammogram order placed- if age 40y or  older, recommend annual  -self breast exams encouraged monthly  -immunizations-needs hep A and  B, MMR.  Complete at local pharmacy COVID-19 booster,  recommend annual influenza shot and new COVID-19 booster in fall  -Vitamin D3  2000 units daily recommended  -Needs 1000 mg of calcium daily for osteoporosis prevention discussed  -thin prep pap recommended every 3 years if normal  - [facility] CHANTELL 2D+3D SCREENING BILAT (CPT=77067/13746); Future  - CBC With Differential With Platelet  - Comp Metabolic Panel  - Lipid Panel  - TSH W Reflex To Free T4  - COLOGUARD COLON CANCER SCREENING (EXTERNAL)  - HGB A1C [496] [Q]  - MMR [20141]  - Hep A & Hep B (Twinrix) 18yrs & older [09300]    2. Acquired hypothyroidism  - TSH W Reflex To Free T4  - levothyroxine 75 MCG Oral Tab; Take 1 tablet (75 mcg total) by mouth before breakfast.  Dispense: 34 tablet; Refill: 10  Take thyroid medication 6 weeks religiously been check fasting lab levels    3. Mixed hyperlipidemia  - Lipid Panel  The patient's ASCVD 10 year risk score is 1.5.  encourage mediterranean diet  Exercise brisk walk 30-60 mins at least 5 days weekly  Lose weight if overweight  Recheck fasting labs annually if controlled    4. Prediabetes  - HGB A1C [496] [Q]  - HGB A1C [496] [Q]; Future  - HGB A1C [496] [Q]  .start exercising daily 30-60min  -low carb and low sugar  Recheck in now and 6 mos - Hemoglobin A1C; Future    5. BMI 28.0-28.9,adult  -Goal BMI 25.0  -weight loss recommended due to increased long term health risks of diabetes, CAD, stroke, HTN and cancer  -follow Mediterranean diet  -options discussed- weight watchers, referral weight loss clinic, if no improvement consider bariatric procedure  -Minimum exercise 30 minutes 5 days a week aerobic activity goals 150 to 300 minutes weekly.    6. Pruritus  - cetirizine 10 MG Oral Tab; Take 1 tablet (10 mg total) by mouth daily.  Dispense: 90 tablet; Refill: 3  - Derm Referral - In Network  Etiology unclear  Risks benefits  and side effects of cetirizine discussed including 10% risk of becoming sedated.  May use hydroxyzine at night for itching.  Recommend dermatology consult    7. Tinea pedis of both feet  - ketoconazole 2 % External Cream; Apply 1 Application topically 2 (two) times daily for 21 days. Max 7 days or can injury skin.never apply face  Dispense: 60 g; Refill: 1    8. Need for vaccination  - MMR [28804]  - Hep A & Hep B (Twinrix) 18yrs & older [28474]    9. Screening for colon cancer  - COLOGUARD COLON CANCER SCREENING (EXTERNAL)  Declined colonscopy    10. Encounter for screening mammogram for malignant neoplasm of breast  - [facility] CHANTELL 2D+3D SCREENING BILAT (CPT=77067/59395); Future    11. Screening for endocrine, nutritional, metabolic and immunity disorder  - Comp Metabolic Panel    12. Screening for iron deficiency anemia  - CBC With Differential With Platelet      The patient indicates understanding of these issues and agrees to the plan.  Return in about 1 year (around 4/18/2025) for annual physical, fasting labs AM, sooner if needed.Draw fasting labs in 6 to 7 weeks.    Diet counseling perfomed  Exercise counseling perfomed    SUGGESTED VACCINATIONS - Influenza, Pneumococcal, Zoster, Tetanus     Immunization History   Administered Date(s) Administered    Covid-19 Vaccine Pfizer 30 mcg/0.3 ml 04/18/2021, 05/16/2021    Hep A, Adult 07/22/2022    TDAP 07/22/2022    Zoster Vaccine Recombinant Adjuvanted (Shingrix) 08/03/2022, 10/05/2022       Influenza Annually   Pneumococcal if high risk   Td/Tdap once then every 10 years   HPV Females 11-26: 3 doses   Zoster (Shingles) 60 and older: one dose   Varicella 2 doses if not immune   MMR 1-2 doses if born after 1957 and not immune

## 2024-04-18 NOTE — PATIENT INSTRUCTIONS
Perform labs fasting 8 hours with water or black coffee or or black tea diet  soda only prior to exam.    -Encourage healthy diet of whole food and avoid processed food and sugary drinks and sodas.  Diet should include lean meats and vegetables including 5-7 servings of fruit and vegetables total in 1 day.  Never skip breakfast.  -Encouraged exercise 30 minutes to 60 minutes 3-5 times weekly for 150minutes or more to prevent obesity and chronic disease and eliminate stress and its effect on the body.  -encouraged to continue not smoking or vaping  - recommend condom use per CDC recommendation for all  or unmarried couples  -mammogram order given if 40years old or older  - immunizations-annual flu shot recommended  -Vitamin D3  2000 units daily recommended- buy Over-the-counter  -Recommend 1000mg of calcium daily for osteoporosis prevention discussed. Need to ingest 1000mg of calcium daily to prevent osteoporosis later in life.  I.e. one 8 ounce glass of silk Marty milk has 450 mg of calcium and label states 45%.  Labels list calcium percentages not milligrams.  To calculate milligrams per serving remove the percentage and add a zero (0).  I.e. 9% calcium equals 90 mg  -thin prep pap recommended every 3 years-If previous pap was normal  sooner as directed by your doctor.        
normal...

## 2024-07-29 ENCOUNTER — TELEPHONE (OUTPATIENT)
Dept: FAMILY MEDICINE CLINIC | Facility: CLINIC | Age: 54
End: 2024-07-29

## 2024-07-29 NOTE — TELEPHONE ENCOUNTER
Received faxed cologuard results from [facility]. Results are available in Care Everywhere. Routed to physician for review.      Component  Ref Range & Units 7 d ago Comments   Test Result  Negative Negative

## 2024-08-07 DIAGNOSIS — F41.9 ANXIETY: ICD-10-CM

## 2024-08-08 ENCOUNTER — PATIENT MESSAGE (OUTPATIENT)
Dept: FAMILY MEDICINE CLINIC | Facility: CLINIC | Age: 54
End: 2024-08-08

## 2024-08-08 DIAGNOSIS — F41.9 ANXIETY: ICD-10-CM

## 2024-08-08 RX ORDER — ESCITALOPRAM OXALATE 20 MG/1
20 TABLET ORAL EVERY MORNING
Qty: 90 TABLET | Refills: 0
Start: 2024-08-08

## 2024-08-08 RX ORDER — ESCITALOPRAM OXALATE 20 MG/1
20 TABLET ORAL EVERY MORNING
Qty: 90 TABLET | Refills: 0 | OUTPATIENT
Start: 2024-08-08

## 2024-08-08 NOTE — TELEPHONE ENCOUNTER
From: Mely Rome  To: Vivian Mon  Sent: 8/8/2024 10:30 AM CDT  Subject: Medication     Hello Dr. Mon   I was trying to refill my Escitalopram and pharmacy said it was denied can you please send a prescription for this. Thank you kindly, Mely Rome

## 2024-08-08 NOTE — TELEPHONE ENCOUNTER
Per chart review, anxiety has not been addressed since 9/2022, pt was on medication, then off, then maybe went to the [facility]?      She needs appt to discuss anxiety med with Dr. Mon. thanks

## 2024-08-08 NOTE — TELEPHONE ENCOUNTER
Refill Failed Protocol:     Pt requesting refill of   Requested Prescriptions     Refused Prescriptions Disp Refills    ESCITALOPRAM 20 MG Oral Tab [Pharmacy Med Name: ESCITALOPRAM 20MG TABLETS] 90 tablet 0     Sig: TAKE 1 TABLET(20 MG) BY MOUTH EVERY MORNING     Last Office Visit with Provider: 4/18/2024    Unable to approve refill,     The original prescription was discontinued on 4/18/2024 by Vivian Mon DO for the following reason: Physician directed. Renewing this prescription may not be appropriate.          No future appointments.

## 2024-12-21 DIAGNOSIS — E03.9 ACQUIRED HYPOTHYROIDISM: ICD-10-CM

## 2024-12-23 ENCOUNTER — PATIENT MESSAGE (OUTPATIENT)
Dept: FAMILY MEDICINE CLINIC | Facility: CLINIC | Age: 54
End: 2024-12-23

## 2024-12-23 DIAGNOSIS — E78.2 MIXED HYPERLIPIDEMIA: ICD-10-CM

## 2024-12-23 DIAGNOSIS — Z00.00 ANNUAL PHYSICAL EXAM: Primary | ICD-10-CM

## 2024-12-23 DIAGNOSIS — E03.9 ACQUIRED HYPOTHYROIDISM: ICD-10-CM

## 2024-12-23 RX ORDER — LEVOTHYROXINE SODIUM 75 UG/1
75 TABLET ORAL
Qty: 34 TABLET | Refills: 0 | OUTPATIENT
Start: 2024-12-23 | End: 2025-12-18

## 2024-12-23 RX ORDER — LEVOTHYROXINE SODIUM 75 UG/1
75 TABLET ORAL
Qty: 30 TABLET | Refills: 0 | Status: SHIPPED | OUTPATIENT
Start: 2024-12-23 | End: 2025-12-18

## 2024-12-23 NOTE — TELEPHONE ENCOUNTER
Patient is out of her meds she would like a refill, has an appointment on 12/27 and last time doctor told her to be on her medication for two weeks then go get tested.

## 2024-12-23 NOTE — TELEPHONE ENCOUNTER
Requested Prescriptions     Pending Prescriptions Disp Refills    levothyroxine 75 MCG Oral Tab 30 tablet 0     Sig: Take 1 tablet (75 mcg total) by mouth before breakfast.     LOV 4/18/2024     Last labs 2022    Patient was asked to follow-up in: 1 year    Appointment scheduled: 12/27/2024 Vivian Mon, DO     Medication refilled per protocol.     Pt aware needs labs , orders are in system

## 2024-12-23 NOTE — TELEPHONE ENCOUNTER
Refill(s) Requested:   Requested Prescriptions     Pending Prescriptions Disp Refills    levothyroxine 75 MCG Oral Tab 34 tablet 10     Sig: Take 1 tablet (75 mcg total) by mouth before breakfast.       Medication Last Prescribed:      Last Rx Date Dose Quantity Refills   4/18/2024 75 mcg 34 tablets 10 refills    Has dose or medication been changed    since last prescription? *Review notes* [ ] Yes  [ ] No     Last office visit: 4/18/2024 (in-office)  Visit date not found (virtual visit)     Relevant details from LOV note:   Acquired hypothyroidism  - TSH W Reflex To Free T4  - levothyroxine 75 MCG Oral Tab; Take 1 tablet (75 mcg total) by mouth before breakfast.  Dispense: 34 tablet; Refill: 10  Take thyroid medication 6 weeks religiously been check fasting lab levels     Relevant lab results:   Free T4 (ng/dL)   Date Value   09/15/2022 1.2     TSH (mIU/mL)   Date Value   09/15/2022 1.450        Patient was asked to follow-up in: 1 year    Appointment due: April 2025    Future Appointments: 12/27/2024 Vivian Mon DO     Action taken:  [] Medication refilled per protocol  [x] Patient is overdue for lab work. Message sent to patient   # 30 with 4 refills routed to Vivian Mon DO for review

## 2024-12-27 ENCOUNTER — TELEMEDICINE (OUTPATIENT)
Dept: FAMILY MEDICINE CLINIC | Facility: CLINIC | Age: 54
End: 2024-12-27
Payer: COMMERCIAL

## 2024-12-27 DIAGNOSIS — Z87.09 HISTORY OF URI (UPPER RESPIRATORY INFECTION): ICD-10-CM

## 2024-12-27 DIAGNOSIS — E03.9 ACQUIRED HYPOTHYROIDISM: ICD-10-CM

## 2024-12-27 DIAGNOSIS — E03.9 MYXEDEMA: Primary | ICD-10-CM

## 2024-12-27 DIAGNOSIS — F41.9 ANXIETY: ICD-10-CM

## 2024-12-27 DIAGNOSIS — Z87.891 HISTORY OF TOBACCO USE: ICD-10-CM

## 2024-12-27 DIAGNOSIS — Z12.31 SCREENING MAMMOGRAM, ENCOUNTER FOR: ICD-10-CM

## 2024-12-27 DIAGNOSIS — R49.0 VOICE HOARSENESS: ICD-10-CM

## 2024-12-27 PROCEDURE — 99214 OFFICE O/P EST MOD 30 MIN: CPT | Performed by: FAMILY MEDICINE

## 2024-12-27 RX ORDER — LEVOTHYROXINE SODIUM 75 UG/1
75 TABLET ORAL
Qty: 90 TABLET | Refills: 0 | Status: SHIPPED | OUTPATIENT
Start: 2024-12-27 | End: 2025-12-22

## 2024-12-27 RX ORDER — HYDROXYZINE HYDROCHLORIDE 25 MG/1
25 TABLET, FILM COATED ORAL EVERY 8 HOURS PRN
Qty: 60 TABLET | Refills: 0 | Status: SHIPPED | OUTPATIENT
Start: 2024-12-27

## 2024-12-27 NOTE — PROGRESS NOTES
The patient's office visit was performed as a video visit.  Time Spent: 21 min    Chief Complaint   Patient presents with    Medication Follow-Up     Anxiety      Subjective     HPI:   Mely Rome is a 54 year old female who presents for medication monitoring.  Patient has history of acquired hypothyroidism ran out of her medication levothyroxine in August.  Patient has noticed she has been tired all the time anxious, and recently her face has been more swollen.  She has gained weight.  Legs and arms feel stiff especially in the morning.  Has malaise.  Denies any palpitations irregular heart rate or leg or arm swelling.  Patient is requesting to restart escitalopram that she took prior to starting thyroid medication or refill of the hydroxyzine.  She denies feeling depressed, sad or hopeless.  Feeling more sluggish and worries.  Call the office and was given a 30-day refill since she is overdue for lab work.  She has been taking levothyroxine for only 3 days.    History of tobacco dependence smoked about a pack a day for 23 years.  Patient states if lung cancer was diagnosed with undergo surgery and treatment.  Has been abstinent from cigarettes since 2021.  Agrees to screening for lung cancer.  Denies any cough, shortness of breath, hemoptysis, unexplained weight loss.    History of URI 2 to 3 weeks ago had cough and runny nose states had moderate infection.  Symptoms resolved but still having some mild hoarseness with speaking.  She denies any shortness of breath, difficulty swallowing.  Denies any chest congestion states cough is completely resolved.    Admits overdue for mammogram never completed.       Chief Complaint Reviewed and Verified  Nursing Notes Reviewed and   Verified  Tobacco Reviewed  Allergies Reviewed  Medications Reviewed    Problem List Reviewed  Medical History Reviewed  Surgical History   Reviewed  Family History Reviewed                Medications Ordered Prior to Encounter[1]         Physical Exam:  There were no vitals taken for this visit.    alert and cooperative, well-nourished/well-hydrated, no no pallor or tachypnea, appropriately groomed, speaking in full sentences comfortably and Normal work of breathing, answers questions appropriately      No visits with results within 6 Month(s) from this visit.   Latest known visit with results is:   Office Visit on 09/15/2022   Component Date Value    Free T4 09/15/2022 1.2     TSH 09/15/2022 1.450     Anti-Thyroglobulin 09/15/2022 <15     Anti-Thyroperoxidase 09/15/2022 38,968 (H)              Assessment    Diagnoses and all orders for this visit:    Myxedema  Acquired hypothyroidism  -     levothyroxine 75 MCG Oral Tab; Take 1 tablet (75 mcg total) by mouth before breakfast.  Uncontrolled  Take levothyroxine daily on empty stomach first thing in the morning-do not miss doses  Repeat TSH and labs in 6 weeks  Discussed patient's developing myxedema with facial swelling and feeling very fatigued and muscle aches  Levothyroxine/thyroid hormone is essential for body functions and become very ill have heart arrhythmias, coma, breathing difficulties excetra if progresses and not treating.  Mood and anxiety issues will resolve as thyroid levels improve  No evidence of depression    Anxiety  -     hydrOXYzine 25 MG Oral Tab; Take 1 tablet (25 mg total) by mouth every 8 (eight) hours as needed for Anxiety (causes sedation. no driving for 8 hr after ingestion).  Hold on Lexapro/escitalopram since takes a few weeks to get full effect and patient will be taking thyroid medication    Voice hoarseness  History of URI (upper respiratory infection)  History of smoking-her recent URI was coughing  Most likely due to vocal cord strain or exacerbated by myxedema  If not resolved in 4 to 6 weeks, then will need further evaluation    History of tobacco use  -     CT LUNG LD SCREENING(CPT=71271); Future  Agrees to further workup, biopsy excetra if CT lung negative  dedicated to remaining abstinent from nicotine use    Screening mammogram, encounter for  -     [facility] CHANTELL 2D+3D SCREENING BILAT (CPT=77067/96336); Future        Lab work ordered.  Reinforced healthy diet, lifestyle, and exercise.    The patient and provider have a longitudinal relationship to address/treat the serious or complex condition as stated in this encounter.      Return in about 6 weeks (around 2/7/2025) for recheck symptoms if hoarseness not resolved or thyroid abnormal.  Otherwise April 2025 for annual PE.    DO Mely Grewal understands video or phone evaluation is not a substitute for face-to-face examination or emergency care. Patient advised to go to ER or call 911 for worsening symptoms or acute distress.     Patient/Caregiver Education: Patient/Caregiver Education: There are no barriers to learning. Medical education done.   Outcome: Patient verbalizes understanding. Patient is notified to call with any questions, complications, allergies, or worsening or changing symptoms.  Patient is to call with any side effects or complications from the treatments as a result of today.     Please note that the following visit was completed using two-way, real-time interactive audio and/or video communication.  This has been done in good hannah to provide continuity of care in the best interest of the provider-patient relationship, due to the on-going public health crisis/national emergency and because of restrictions of visitation.  There are limitations of this visit as no physical exam could be performed.  Every conscious effort was taken to allow for sufficient and adequate time.  This billing visit was spent on reviewing labs, medications, radiology tests and decision making.  Appropriate medical decision-making and tests are ordered as detailed in the plan of care above.          [1]   Current Outpatient Medications on File Prior to Visit   Medication Sig Dispense Refill    levothyroxine  75 MCG Oral Tab Take 1 tablet (75 mcg total) by mouth before breakfast. 30 tablet 0    cetirizine 10 MG Oral Tab Take 1 tablet (10 mg total) by mouth daily. 90 tablet 3    hydrOXYzine 25 MG Oral Tab Take 1 tablet (25 mg total) by mouth every 8 (eight) hours as needed for Anxiety (causes sedation. no driving for 8 hr after ingestion). 60 tablet 2     No current facility-administered medications on file prior to visit.

## 2025-05-12 ENCOUNTER — PATIENT MESSAGE (OUTPATIENT)
Dept: FAMILY MEDICINE CLINIC | Facility: CLINIC | Age: 55
End: 2025-05-12

## 2025-05-12 NOTE — TELEPHONE ENCOUNTER
Pt states had a episode of fatigue, heart palpitations and feeling shaky and sweaty- lasting about an hour. Pt was at work when started. Pt went home rested, took her thyroid medication and began to feel better, Pt then took a nap. Pt woke feeling better, no symptoms- no chest pains, no SOB, no dizziness, no back/arm pain, no blurred vision, no headache. Pt states has had this in the past and was regarding her thyroid. Last TSH was 9/2022. Pt was to get lab done 2/2025 but never got done. Advised Pt to get fasting labs done at [facility].  Advised if any symptoms return or if any chest pains, dizziness, headaches, sweating, back pain occurs- go to ER.  Pt agrees with plan

## 2025-05-14 ENCOUNTER — PATIENT MESSAGE (OUTPATIENT)
Dept: FAMILY MEDICINE CLINIC | Facility: CLINIC | Age: 55
End: 2025-05-14

## 2025-05-14 ENCOUNTER — TELEPHONE (OUTPATIENT)
Dept: FAMILY MEDICINE CLINIC | Facility: CLINIC | Age: 55
End: 2025-05-14

## 2025-05-14 LAB
ABSOLUTE BASOPHILS: 49 CELLS/UL (ref 0–200)
ABSOLUTE EOSINOPHILS: 291 CELLS/UL (ref 15–500)
ABSOLUTE LYMPHOCYTES: 3861 CELLS/UL (ref 850–3900)
ABSOLUTE MONOCYTES: 621 CELLS/UL (ref 200–950)
ABSOLUTE NEUTROPHILS: 4879 CELLS/UL (ref 1500–7800)
ALBUMIN/GLOBULIN RATIO: 1.4 (CALC) (ref 1–2.5)
ALBUMIN: 4.3 G/DL (ref 3.6–5.1)
ALKALINE PHOSPHATASE: 95 U/L (ref 37–153)
ALT: 23 U/L (ref 6–29)
AST: 21 U/L (ref 10–35)
BASOPHILS: 0.5 %
BILIRUBIN, TOTAL: 0.5 MG/DL (ref 0.2–1.2)
BUN: 12 MG/DL (ref 7–25)
CALCIUM: 9.6 MG/DL (ref 8.6–10.4)
CARBON DIOXIDE: 27 MMOL/L (ref 20–32)
CHLORIDE: 106 MMOL/L (ref 98–110)
CHOL/HDLC RATIO: 4.8 (CALC)
CHOLESTEROL, TOTAL: 276 MG/DL
CREATININE: 0.83 MG/DL (ref 0.5–1.03)
EGFR: 83 ML/MIN/1.73M2
EOSINOPHILS: 3 %
GLOBULIN: 3.1 G/DL (CALC) (ref 1.9–3.7)
GLUCOSE: 107 MG/DL (ref 65–99)
HDL CHOLESTEROL: 58 MG/DL
HEMATOCRIT: 45.9 % (ref 35–45)
HEMOGLOBIN A1C: 6.6 %
HEMOGLOBIN: 14.5 G/DL (ref 11.7–15.5)
LDL-CHOLESTEROL: 177 MG/DL (CALC)
LYMPHOCYTES: 39.8 %
MCH: 28.7 PG (ref 27–33)
MCHC: 31.6 G/DL (ref 32–36)
MCV: 90.9 FL (ref 80–100)
MONOCYTES: 6.4 %
MPV: 10.5 FL (ref 7.5–12.5)
NEUTROPHILS: 50.3 %
NON-HDL CHOLESTEROL: 218 MG/DL (CALC)
PLATELET COUNT: 255 THOUSAND/UL (ref 140–400)
POTASSIUM: 4.3 MMOL/L (ref 3.5–5.3)
PROTEIN, TOTAL: 7.4 G/DL (ref 6.1–8.1)
RDW: 13 % (ref 11–15)
RED BLOOD CELL COUNT: 5.05 MILLION/UL (ref 3.8–5.1)
SODIUM: 140 MMOL/L (ref 135–146)
T4, FREE: 1.1 NG/DL (ref 0.8–1.8)
TRIGLYCERIDES: 249 MG/DL
TSH W/REFLEX TO FT4: 16.29 MIU/L
WHITE BLOOD CELL COUNT: 9.7 THOUSAND/UL (ref 3.8–10.8)

## 2025-05-14 NOTE — TELEPHONE ENCOUNTER
Pt called office asking to speak to a nurse in regards to the labs that she was ordered. Pt knows lab results are not in yet pt states a call immediately is not needed its ok to call tomorrow

## 2025-05-16 ENCOUNTER — PATIENT MESSAGE (OUTPATIENT)
Dept: FAMILY MEDICINE CLINIC | Facility: CLINIC | Age: 55
End: 2025-05-16

## 2025-05-16 DIAGNOSIS — E03.9 ACQUIRED HYPOTHYROIDISM: ICD-10-CM

## 2025-05-16 NOTE — TELEPHONE ENCOUNTER
Telemedicine 12/27/24  LOV 04/18/24    Explained to pt. that Dr. Mon still has to review lab results and we will notify her once they are reviewed and resulted. Pt. verbalized understanding.     Pt. states that she has about 10 days left of her levothyroxine medication and is asking for a refill. Pt. is unsure if Dr. Mon is going to want to increase her dose based on results.    Routed to Dr. Gann as an F.Y.I when reviewing pt.'s lab results from 05/13/25.    Pt. scheduled for her annual physical on 05/29/25

## 2025-05-19 ENCOUNTER — PATIENT MESSAGE (OUTPATIENT)
Dept: FAMILY MEDICINE CLINIC | Facility: CLINIC | Age: 55
End: 2025-05-19

## 2025-05-19 RX ORDER — LEVOTHYROXINE SODIUM 88 UG/1
88 TABLET ORAL
Qty: 90 TABLET | Refills: 0 | Status: SHIPPED | OUTPATIENT
Start: 2025-05-19 | End: 2026-05-14

## 2025-05-19 NOTE — TELEPHONE ENCOUNTER
Patient's cholesterol is very high.  Her TSH is uncontrolled.  I increased her levothyroxine to 88 mcg but she needs an office visit to discuss her cholesterol management and her thyroid.  Please have her keep her appointment on 5/29/2025.

## 2025-05-29 ENCOUNTER — OFFICE VISIT (OUTPATIENT)
Dept: FAMILY MEDICINE CLINIC | Facility: CLINIC | Age: 55
End: 2025-05-29
Payer: COMMERCIAL

## 2025-05-29 VITALS
OXYGEN SATURATION: 96 % | WEIGHT: 140 LBS | RESPIRATION RATE: 16 BRPM | HEIGHT: 58 IN | TEMPERATURE: 97 F | BODY MASS INDEX: 29.39 KG/M2 | SYSTOLIC BLOOD PRESSURE: 112 MMHG | HEART RATE: 79 BPM | DIASTOLIC BLOOD PRESSURE: 70 MMHG

## 2025-05-29 DIAGNOSIS — Z23 NEED FOR VACCINATION: ICD-10-CM

## 2025-05-29 DIAGNOSIS — Z12.31 VISIT FOR SCREENING MAMMOGRAM: ICD-10-CM

## 2025-05-29 DIAGNOSIS — F41.9 ANXIETY: ICD-10-CM

## 2025-05-29 DIAGNOSIS — E03.9 ACQUIRED HYPOTHYROIDISM: ICD-10-CM

## 2025-05-29 DIAGNOSIS — R73.03 PREDIABETES: ICD-10-CM

## 2025-05-29 DIAGNOSIS — Z00.00 ANNUAL PHYSICAL EXAM: Primary | ICD-10-CM

## 2025-05-29 DIAGNOSIS — E78.2 MIXED HYPERLIPIDEMIA: ICD-10-CM

## 2025-05-29 PROCEDURE — 90746 HEPB VACCINE 3 DOSE ADULT IM: CPT | Performed by: FAMILY MEDICINE

## 2025-05-29 PROCEDURE — 99214 OFFICE O/P EST MOD 30 MIN: CPT | Performed by: FAMILY MEDICINE

## 2025-05-29 PROCEDURE — 90471 IMMUNIZATION ADMIN: CPT | Performed by: FAMILY MEDICINE

## 2025-05-29 PROCEDURE — 90472 IMMUNIZATION ADMIN EACH ADD: CPT | Performed by: FAMILY MEDICINE

## 2025-05-29 PROCEDURE — 99396 PREV VISIT EST AGE 40-64: CPT | Performed by: FAMILY MEDICINE

## 2025-05-29 PROCEDURE — 90677 PCV20 VACCINE IM: CPT | Performed by: FAMILY MEDICINE

## 2025-05-29 RX ORDER — HYDROXYZINE HYDROCHLORIDE 25 MG/1
25 TABLET, FILM COATED ORAL EVERY 8 HOURS PRN
Qty: 60 TABLET | Refills: 0 | Status: SHIPPED | OUTPATIENT
Start: 2025-05-29

## 2025-05-29 NOTE — PROGRESS NOTES
The following individual(s) verbally consented to be recorded using ambient AI listening technology and understand that they can each withdraw their consent to this listening technology at any point by asking the clinician to turn off or pause the recording:    Patient name: Mely Rome  Additional names:  no

## 2025-05-29 NOTE — PROGRESS NOTES
REASON FOR VISIT:    Mely Rome is a 54 year old female who presents for an Annual Health Assessment.    Discuss labs completed 2025    Pt had a history of hypothyroidism and here to recheck. Has been tolerating the medication well. Admits not missing doses frequently, increased to 88mcg 2 weeks ago previously on 75mcg.   Last TSH-16.29 on 2025  TSH (mIU/mL)   Date Value   09/15/2022 1.450   2022 38.500 (H)   needs repeat. Denies any shakiness, increased BM's or wgt loss. Denies any fatigue. Admits to wgt gain. Had episodes of palpitations off thyroid medication. Now resolved    Prediabetes- denies polyuria, polydipsia, or vision changes  Last A1c  HEMOGLOBIN A1c (%)   Date Value   2025 6.6 (H)     HgbA1C (%)   Date Value   2022 6.1 (H)     History of tobacco dependence smoked about a pack a day for 23 years. Patient states if lung cancer was diagnosed with undergo surgery and treatment. Has been abstinent from cigarettes since . Agrees to screening for lung cancer. Denies any cough, shortness of breath, hemoptysis, unexplained weight loss. Not completed lung exam    , monogamous    menses: menopausal age 49, denies vaginal bleeding  Birth control: N/a  Last pap: 2022 normal and negative HPV   History of abnormal pap: Denies  On vit D daily    MVI-yes daily with vitamin D-no  Calcium- No medications  Colonoscopy/Cologuard negative 2024 denies family history of colon cancer  Mammogram- overdue greater than 10 years-scheduled 2022 at 11:20 AM  Dexa  Exercise - no  Diet-states relatively healthy consumes meat, some vegetables  Dentist regularly-yes  Annual eye exam-wearing readers, no  Etoh: 0-3  drinks per month  Cigs: former smoker quit 2021- needs lung scan  Illicits: Denies, no marijuana  Immunizations: needs Hep B, PCV20 and covid 19 booster  FH significant: reviewed. Denies colon, breast, ovarian/cervical cancer, diabetes, MI, HTN  Family History    Problem Relation Age of Onset    Mental Disorder Mother     Psychiatric Mother         Bipolar and schezophrenia    Dementia Father     No Known Problems Daughter     No Known Problems Son     No Known Problems Maternal Grandmother     No Known Problems Maternal Grandfather     Breast Cancer Paternal Grandmother     No Known Problems Paternal Grandfather     No Known Problems Son     No Known Problems Son     No Known Problems Son          Patient Active Problem List   Diagnosis    Anxiety    History of gestational diabetes    BMI 28.0-28.9,adult    History of hyperthyroidism    Acquired hypothyroidism    Mixed hyperlipidemia    Prediabetes    History of anxiety    History of tobacco use    History of URI (upper respiratory infection)    Tinea pedis of both feet    Pruritus    Voice hoarseness     Current Outpatient Medications   Medication Sig Dispense Refill    levothyroxine 88 MCG Oral Tab Take 1 tablet (88 mcg total) by mouth before breakfast. 90 tablet 0    hydrOXYzine 25 MG Oral Tab Take 1 tablet (25 mg total) by mouth every 8 (eight) hours as needed for Anxiety (causes sedation. no driving for 8 hr after ingestion). 60 tablet 0    cetirizine 10 MG Oral Tab Take 1 tablet (10 mg total) by mouth daily. (Patient not taking: Reported on 5/29/2025) 90 tablet 3     Wt Readings from Last 6 Encounters:   05/29/25 140 lb (63.5 kg)   04/18/24 134 lb 3.2 oz (60.9 kg)   09/15/22 124 lb 9.6 oz (56.5 kg)   08/03/22 125 lb 6.4 oz (56.9 kg)   07/22/22 124 lb (56.2 kg)   06/23/22 125 lb (56.7 kg)     Body mass index is 29.26 kg/m².    No results found for: \"GLUCOSE\"  Lab Results   Component Value Date    CHOLEST 276 (H) 05/13/2025    CHOLEST 248 (H) 08/03/2022     Lab Results   Component Value Date    HDL 58 05/13/2025    HDL 65 (H) 08/03/2022     No results found for: \"TRIGLY\"  Lab Results   Component Value Date     (H) 05/13/2025     (H) 08/03/2022     Lab Results   Component Value Date    AST 21 05/13/2025     AST 17 08/03/2022     Lab Results   Component Value Date    ALT 23 05/13/2025    ALT 31 08/03/2022     Lab Results   Component Value Date    TSH 1.450 09/15/2022    TSH 38.500 (H) 08/03/2022     Lab Results   Component Value Date    BUN 12 05/13/2025    BUN 10 08/03/2022    CREATSERUM 0.83 05/13/2025    CREATSERUM 1.04 (H) 08/03/2022       General Health     How would you describe your current health state?: Good    Type of Diet: Other    How do you maintain positive mental well-being?: Visiting Family, Visiting Friends, Puzzles, Social Interaction, Games    How would you describe your daily physical activity?: Moderate    If you are a male age 45-79 or a female age 55-79, do you take aspirin?: No    Have you had any immunizations at another office such as Influenza, Hepatitis B, Tetanus, or Pneumococcal?: No    At any time do you feel concerned for the safety/well-being of yourself and/or your children, in your home or elsewhere?: No     CAGE:     Cut: Have you ever felt you should Cut down on your drinking?: No    Annoyed: Have people Annoyed you by criticizing your drinking?: No    Guilty: Have you ever felt bad or Guilty about your drinking?: No    Eye Opener: Have you ever had a drink first thing in the morning to steady your nerves or to get rid of a hangover (Eye opener)?: No    Scoring  Total Score: 0     Depression Screening (PHQ-2/PHQ-9): Over the LAST 2 WEEKS   Little interest or pleasure in doing things (over the last two weeks)?: Not at all    Feeling down, depressed, or hopeless (over the last two weeks)?: Not at all    PHQ-2 SCORE: 0        PREVENTATIVE SERVICES  INDICATIONS AND SCHEDULE Recommendation Internal Lab or Procedure External Lab or Procedure   Breast Cancer Screening   Every 2 yrs age 50-74 Health Maintenance   Topic Date Due    Mammogram  Never done       Pap Every 3 yrs age 21-65 or Pap and HPV every 5 yrs age 30-65 Health Maintenance   Topic Date Due    Pap Smear  07/22/2025        Chlamydia Screening Screen Annually age<25, if sex active/on OCPs; >24 high risk No results found for: \"CHLAMYDIA\"    Colonoscopy Screen Every 10 years Health Maintenance   Topic Date Due    Colorectal Cancer Screening  07/22/2027       Flex Sigmoidoscopy Screen  Every 5 years No results found for this or any previous visit.    Fecal Occult Blood  Annually No results found for: \"FOB\", \"OCCULTSTOOL\"    Obesity Screening Screen all adults annually Body mass index is 29.26 kg/m².      Preventive Services for Which Recommendations Vary with Risk Recommendation Internal Lab or Procedure External Lab or Procedure   Cholesterol Screening Recommended screening varies with age, risk and gender LDL-CHOLESTEROL (mg/dL (calc))   Date Value   05/13/2025 177 (H)       Diabetes Screening  if history of high blood pressure or other  risk factors HEMOGLOBIN A1c (%)   Date Value   05/13/2025 6.6 (H)     GLUCOSE (mg/dL)   Date Value   05/13/2025 107 (H)         Gonorrhea Screening if high risk No results found for: \"GONOCOCCUS\"    HIV Screening For all adults age 18-65, older adults at increased risk No results found for: \"HIV\"    Syphilis Screening Screen if pregnant or high risk No results found for: \"RPR\"    Hepatitis C Screening Screen those at high risk plus screen one time for adults born 1946-1 965 No results found for: \"HCVAB\"    Tuberculosis Screen if high risk No components found for: \"PPDINDURAT\"      Disease Monitoring:    SPECIFIC DISEASE MONITORING Internal Lab or Procedure External Lab or Procedure   Annual Monitoring of Persistent     Medications (ACE/ARB, digoxin, diuretics)    Potassium  Annually POTASSIUM (mmol/L)   Date Value   05/13/2025 4.3         No data to display                Creatinine  Annually CREATININE (mg/dL)   Date Value   05/13/2025 0.83         No data to display                Digoxin Serum Conc  Annually No results found for: \"DIGOXIN\"      No data to display                Diabetes      HgbA1C   Annually HEMOGLOBIN A1c (%)   Date Value   05/13/2025 6.6 (H)         No data to display                Creat/alb ratio  Annually CREATININE (mg/dL)   Date Value   05/13/2025 0.83        LDL  Annually LDL-CHOLESTEROL (mg/dL (calc))   Date Value   05/13/2025 177 (H)         No data to display                 Dilated Eye exam  Annually      No data to display                   No data to display                Asthma  (Annually between Nov. 1 & Dec. 31)    Date of last AAP/ACT and counseling given on importance of controller meds.                 ALLERGIES:   No Known Allergies    CURRENT MEDICATIONS:   Current Outpatient Medications   Medication Sig Dispense Refill    levothyroxine 88 MCG Oral Tab Take 1 tablet (88 mcg total) by mouth before breakfast. 90 tablet 0    hydrOXYzine 25 MG Oral Tab Take 1 tablet (25 mg total) by mouth every 8 (eight) hours as needed for Anxiety (causes sedation. no driving for 8 hr after ingestion). 60 tablet 0    cetirizine 10 MG Oral Tab Take 1 tablet (10 mg total) by mouth daily. (Patient not taking: Reported on 5/29/2025) 90 tablet 3      MEDICAL INFORMATION:   Past Medical History:    Anxiety    Depression    Hyperthyroidism      Past Surgical History:   Procedure Laterality Date    D & c      1993 [facility]      Family History   Problem Relation Age of Onset    Mental Disorder Mother     Psychiatric Mother         Bipolar and schezophrenia    Dementia Father     No Known Problems Daughter     No Known Problems Son     No Known Problems Maternal Grandmother     No Known Problems Maternal Grandfather     Breast Cancer Paternal Grandmother     No Known Problems Paternal Grandfather     No Known Problems Son     No Known Problems Son     No Known Problems Son       SOCIAL HISTORY:   Social History     Socioeconomic History    Marital status: Single   Tobacco Use    Smoking status: Former     Current packs/day: 0.00     Average packs/day: 1 pack/day for 23.0 years (23.0 ttl  pk-yrs)     Types: Cigarettes     Start date: 9/1/1998     Quit date: 9/1/2021     Years since quitting: 3.7    Smokeless tobacco: Never   Vaping Use    Vaping status: Never Used   Substance and Sexual Activity    Alcohol use: Not Currently     Alcohol/week: 0.0 standard drinks of alcohol     Comment: Casual drinker    Drug use: Never    Sexual activity: Not Currently     Partners: Male   Other Topics Concern    Caffeine Concern No    Exercise No    Seat Belt No    Special Diet No    Stress Concern Yes     Comment: Slightly not extreme    Weight Concern No     Occ:     : Life partner       REVIEW OF SYSTEMS:   GENERAL: feels well otherwise  SKIN: denies any unusual skin lesions  EYES: denies blurred vision or double vision  HEENT: denies nasal congestion, sinus pain or ST  LUNGS: denies shortness of breath with exertion  CARDIOVASCULAR: denies chest pain on exertion  GI: denies abdominal pain, denies heartburn  : denies dysuria, vaginal discharge or itching, menopausal, denies vaginal bleeding  MUSCULOSKELETAL: denies back pain  NEURO: denies headaches  PSYCHE: denies depression or anxiety  HEMATOLOGIC: denies hx of anemia  ENDOCRINE: hx thyroid history  ALL/ASTHMA: denies hx of allergy or asthma    EXAM:   /70 (BP Location: Left arm, Patient Position: Sitting, Cuff Size: adult)   Pulse 79   Temp 97.3 °F (36.3 °C) (Temporal)   Resp 16   Ht 4' 10\" (1.473 m)   Wt 140 lb (63.5 kg)   SpO2 96%   BMI 29.26 kg/m²    No LMP recorded. (Menstrual status: Menopause).   GENERAL: well developed, well nourished, in no apparent distress  SKIN: no rashes-except right foot is exfoliation and thickening of the skin consistent with tenia penis no pus or redness or drainage, no suspicious lesions  HEENT: atraumatic, normocephalic, ears and throat are clear  EYES:PERRLA, EOMI, normal optic disk, conjunctiva are clear  NECK: supple, no adenopathy, no bruits  CHEST: no chest tenderness  BREAST: no dominant or  suspicious mass bilateral, no axillary masses, normal  LUNGS: clear to auscultation  CARDIO: RRR without murmur  GI: good BS's, no masses, HSM or tenderness  : introitus is normal, scant discharge, cervix is pink, no adnexal masses or tenderness,   RECTAL: good rectal tone  MUSCULOSKELETAL: back is not tender, FROM of the back  EXTREMITIES: no cyanosis, clubbing or edema  NEURO: Oriented times three, cranial nerves are intact, motor and sensory are grossly intact    ASSESSMENT AND OTHER RELEVANT CHRONIC CONDITIONS:   Mely Rome is a 54 year old female who presents for an Annual Health Assessment.     PLAN SUMMARY:     1. Annual physical exam  -Encourage Mediterranean diet  -Encouraged exercise 30 minutes to 60 minutes daily x 3-5x weekly for 150 minutes or more to prevent obesity and chronic disease and eliminate stress and its effect on the body.  -encouraged to continue not smoking  -safe sex practices - recommend condom use  -mammogram order placed- if age 40y or older, recommend annual  -self breast exams encouraged monthly  -immunizations-needs hep B and PCV20.  Complete at local pharmacy COVID-19 booster,  recommend annual influenza shot and new COVID-19 booster in fall  -Vitamin D3  2000 units daily recommended  -Needs 1000 mg of calcium daily for osteoporosis prevention discussed  -thin prep pap recommended every 3 years if normal  - PCV20 (Prevnar 20)  - HGB A1C [496] [Q]  - COMP METABOLIC PANEL [79780] [Q]  - LIPID PANEL [7600] [Q]    2. Acquired hypothyroidism  - TSH W REFLEX TO FREE T4 [48591][Q]  - COMP METABOLIC PANEL [14929] [Q]  Uncontrolled  On levothyroxine 88mcg x 2 weeks  Compliance with taking medication regularly emphasized  Recheck TSH in 6 to 8 weeks end of July and follow-up in office    3. Prediabetes  - HGB A1C [496] [Q]  - COMP METABOLIC PANEL [34427] [Q]  Cross diabetic threshold of thyroids uncontrolled cholesterol is uncontrolled  A1c at 6.6 not any medication  Work on  controlling thyroid, losing weight and going and eating low-carb low sugar Mediterranean diet  Walk at least 30 minutes daily  Lose 5 to 10 pounds half pound a pound a week by next visit  Recheck A1c in 8 weeks if still at the diabetic threshold will be declared diabetic  Hold on starting any medication    4. Mixed hyperlipidemia  - COMP METABOLIC PANEL [55464] [Q]  - LIPID PANEL [7600] [Q]  The patient's ASCVD 10 year risk score is 2.  encourage mediterranean diet  Exercise brisk walk 30-60 mins at least 5 days weekly  Lose weight if overweight  Recheck fasting labs in 8 weeks if not improving with elevated triglycerides and LDL with controlled thyroid will start statin medication    5. Anxiety  - hydrOXYzine 25 MG Oral Tab; Take 1 tablet (25 mg total) by mouth every 8 (eight) hours as needed for Anxiety (causes sedation. no driving for 8 hr after ingestion).  Dispense: 60 tablet; Refill: 0  Controlled    6. Need for vaccination  - PCV20 (Prevnar 20)  - Hep B, Adult [01339]    7. Encounter for screening mammogram for malignant neoplasm of breast  - [facility] CHANTELL 2D+3D SCREENING BILAT (CPT=77067/44709); Future  Importance of annual mammograms discussed to detect early cancer        The patient indicates understanding of these issues and agrees to the plan.  Return in about 7 weeks (around 7/14/2025) for discuss labs, medication monitoring, sooner if needed., pap smear.    Diet counseling perfomed  Exercise counseling perfomed    SUGGESTED VACCINATIONS - Influenza, Pneumococcal, Zoster, Tetanus     Immunization History   Administered Date(s) Administered    Covid-19 Vaccine Pfizer 30 mcg/0.3 ml 04/18/2021, 05/16/2021    HEP A/HEP B Combined 04/18/2024    Hep A, Adult 07/22/2022    MMR 04/18/2024    TDAP 07/22/2022    Zoster Vaccine Recombinant Adjuvanted (Shingrix) 08/03/2022, 10/05/2022       Influenza Annually   Pneumococcal if high risk   Td/Tdap once then every 10 years   HPV Females 11-26: 3 doses   Zoster (Shingles)  60 and older: one dose   Varicella 2 doses if not immune   MMR 1-2 doses if born after 1957 and not immune

## 2025-05-29 NOTE — PATIENT INSTRUCTIONS
Perform labs fasting 8 hours with water or black coffee or or black tea diet  soda only prior to exam.    -Encourage healthy diet of whole food and avoid processed food and sugary drinks and sodas.  Diet should include lean meats and vegetables including 5-7 servings of fruit and vegetables total in 1 day.  Never skip breakfast.  -Encouraged exercise 30 minutes to 60 minutes 3-5 times weekly for 150minutes or more to prevent obesity and chronic disease and eliminate stress and its effect on the body.  -encouraged to continue not smoking or vaping  - recommend condom use per CDC recommendation for all  or unmarried couples  -mammogram order given if 40years old or older  - immunizations-annual flu shot recommended  -Vitamin D3  2000 units daily recommended- buy Over-the-counter  -Recommend 1000mg of calcium daily for osteoporosis prevention discussed. Need to ingest 1000mg of calcium daily to prevent osteoporosis later in life.  I.e. one 8 ounce glass of silk [place] milk has 450 mg of calcium and label states 45%.  Labels list calcium percentages not milligrams.  To calculate milligrams per serving remove the percentage and add a zero (0).  I.e. 9% calcium equals 90 mg  -thin prep pap recommended every 3 years-If previous pap was normal  sooner as directed by your doctor.    Exercise for a Healthier Heart     Exercise with a friend. When activity is fun, you're more likely to stick with it.   You may wonder how you can improve the health of your heart. If you’re thinking about exercise, you’re on the right track. You don’t need to become an athlete, but you do need a certain amount of brisk exercise to help strengthen your heart. If you have been diagnosed with a heart condition, your doctor may recommend exercise to help stabilize your condition. To help make exercise a habit, choose safe, fun activities.  Be sure to check with your healthcare provider before starting an exercise program.   Why  exercise?  Exercising regularly offers many healthy rewards. It can help you do all of the following:  Improve your blood cholesterol level to help prevent further heart trouble  Lower your blood pressure to help prevent a stroke or heart attack  Control diabetes, or reduce your risk of getting this disease  Improve your heart and lung function  Reach and maintain a healthy weight  Make your muscles stronger and more limber so you can stay active  Prevent falls and fractures by slowing the loss of bone mass (osteoporosis)  Manage stress better  Reduce your blood pressure  Improve your sense of self and your body image  Exercise tips  Ease into your routine. Set small goals. Then build on them.  Exercise on most days. Aim for a total of 150 or more minutes of moderate to  vigorous intensity activity each week. Consider 40 minutes, 3 to 4 times a week. For best results, activity should last for 40 minutes on average. It is OK to work up to the 40 minute period over time. Examples of moderate-intensity activity is walking 1 mile in 15 minutes or 30 to 45 minutes of yard work.  Step up your daily activity level. Along with your exercise program, try being more active throughout the day. Walk instead of drive. Do more household tasks or yard work.  Choose one or more activities you enjoy. Walking is one of the easiest things you can do. You can also try swimming, riding a bike, dancing, or taking an exercise class.  Stop exercising and call your doctor if you:  Have chest pain or feel dizzy or lightheaded  Feel burning, tightness, pressure, or heaviness in your chest, neck, shoulders, back, or arms  Have unusual shortness of breath  Have increased joint or muscle pain  Have palpitations or an irregular heartbeat   Date Last Reviewed: 5/1/2016 © 2000-2018 [employer]. [address] [place] [address]. All rights reserved. This information is not intended as a substitute for professional medical  care. Always follow your healthcare professional's instructions.      Eating Heart-Healthy Foods  Eating has a big impact on your heart health. In fact, eating healthier can improve several of your heart risks at once. For instance, it helps you manage weight, cholesterol, and blood pressure. Here are ideas to help you make heart-healthy changes without giving up all the foods and flavors you love.  Getting started  Talk with your healthcare provider about eating plans, such as the DASH or Mediterranean diet. You may also be referred to a dietitian.  Change a few things at a time. Give yourself time to get used to a few eating changes before adding more.  Work to create a tasty, healthy eating plan that you can stick to for the rest of your life.    Goals for healthy eating  Below are some tips to improve your eating habits:  Limit saturated fats and trans fats. Saturated fats raise your levels of cholesterol, so keep these fats to a minimum. They are found in foods such as fatty meats, whole milk, cheese, and palm and coconut oils. Avoid trans fats because they lower good cholesterol as well as raise bad cholesterol. Trans fats are most often found in processed foods.  Reduce sodium (salt) intake. Eating too much salt may increase your blood pressure. Limit your sodium intake to 2,300 milligrams (mg) per day (the amount in 1 teaspoon of salt), or less if your healthcare provider recommends it. Dining out less often and eating fewer processed foods are two great ways to decrease the amount of salt you consume.  Managing calories. A calorie is a unit of energy. Your body burns calories for fuel, but if you eat more calories than your body burns, the extras are stored as fat. Your healthcare provider can help you create a diet plan to manage your calories. This will likely include eating healthier foods as well as exercising regularly. To help you track your progress, keep a diary to record what you eat and how often  you exercise.  Choose the right foods  Aim to make these foods staples of your diet. If you have diabetes, you may have different recommendations than what is listed here:  Fruits and vegetables provide plenty of nutrients without a lot of calories. At meals, fill half your plate with these foods. Split the other half of your plate between whole grains and lean protein.  Whole grains are high in fiber and rich in vitamins and nutrients. Good choices include whole-wheat bread, pasta, and brown rice.  Lean proteins give you nutrition with less fat. Good choices include fish, skinless chicken, and beans.  Low-fat or nonfat dairy provides nutrients without a lot of fat. Try low-fat or nonfat milk, cheese, or yogurt.  Healthy fats can be good for you in small amounts. These are unsaturated fats, such as olive oil, nuts, and fish. Try to have at least 2 servings per week of fatty fish, such as salmon, sardines, mackerel, rainbow trout, and albacore tuna. These contain omega-3 fatty acids, which are good for your heart. Flaxseed is another source of a heart-healthy fat.  More on heart-healthy eating  Read food labels  Healthy eating starts at the grocery store. Be sure to pay attention to food labels on packaged foods. Look for products that are high in fiber and protein, and low in saturated fat, cholesterol, and sodium. Avoid products that contain trans fat. And pay close attention to serving size. For instance, if you plan to eat two servings, double all the numbers on the label.  Prepare food right  A key part of healthy cooking is cutting down on added fat and salt. Look on the internet for lower-fat, lower-sodium recipes. Also, try these tips:  Remove fat from meat and skin from poultry before cooking.  Skim fat from the surface of soups and sauces.  Broil, boil, bake, steam, grill, and microwave food without added fats.  Choose ingredients that spice up your food without adding calories, fat, or sodium. Try these  items: horseradish, hot sauce, lemon, mustard, nonfat salad dressings, and vinegar. For salt-free herbs and spices, try basil, cilantro, cinnamon, pepper, and rosemary.  Date Last Reviewed: 10/1/2017  © 3972-6285 [employer]. [address] [place] [address]. All rights reserved. This information is not intended as a substitute for professional medical care. Always follow your healthcare professional's instructions.       What Is Osteoporosis?  Osteoporosis is a disease that weakens the bones. Weakened bones are more likely to break (fracture). Osteoporosis affects both men and women. But postmenopausal women are most at risk. To help prevent osteoporosis, you need to exercise and nourish your bones throughout your life.    Childhood  The body builds the most bone during these years. That's why boys and girls need foods rich in calcium. They also need plenty of exercise. A healthy diet and exercise helps bones grow strong.  Young adulthood to age 30  During young adulthood, bones become their strongest. This is called peak bone mass. The same good habits that kept bones healthy in childhood help keep bones healthy in adulthood.  Age 30 to menopause  Bone mass declines slightly during these years. Your body makes just enough new bone to maintain peak bone mass. To keep your bones at their peak mass, be sure to exercise and get plenty of calcium.  After menopause  Menopause is when a woman stops having monthly periods. After menopause, the body makes less estrogen (female hormone). This increases bone loss. At this point, treatment may be needed to reduce the risk for fracture. Exercise and calcium can also help keep your bones strong.  Later in life  In later years, both men and women need to take extra care of their bones. By this point, the body loses more bone than it makes. If too much bone is lost, you may be at increased risk for fractures. With age, the quality and quantity of bone  declines. You can lessen bone loss by staying active and increasing your calcium intake. Calcium supplements and other osteoporosis treatments do have risks. So talk with your healthcare provider if you have concerns. If you have osteoporosis, you can also learn ways to increase everyday safety.  Date Last Reviewed: 5/1/2018  © 2532-0916 [employer]. [address] [place] [address], [place] [address]. All rights reserved. This information is not intended as a substitute for professional medical care. Always follow your healthcare professional's instructions.          Preventing Osteoporosis: Meeting Your Calcium Needs    Your body needs calcium to build and repair bones. But it can't make calcium on its own. That's why it's important to eat calcium-rich foods. Some foods are naturally rich in calcium. Others have calcium added (fortified). It's best to get calcium from the foods you eat. But if you can't get enough, you may want to take calcium supplements. To meet your daily calcium needs, try the foods listed below.               Dairy Fish & beans Other sources   Source   Calcium (mg) per serving   Source   Calcium (mg) per serving   Source   Calcium (mg) per serving   Low-fat yogurt, plain   415 mg/8 oz.   Sardines, [place], canned, with bones   351 mg/3 oz.   Oatmeal, instant, fortified   215 mg/1 cup   Nonfat milk   302 mg/1 cup   [place], sockeye, canned, with bones   239 mg/3 oz.   Tofu made with calcium sulfate   204 mg/3 oz.   Low-fat milk   297 mg/1 cup   Soybeans, fresh, boiled   131 mg/1/2 cup   Collards   179 mg/1/2 cup   Swiss cheese   272 mg/1 oz.   White beans, cooked   81 mg/1/2 cup   English muffin, whole wheat   175 mg/1 muffin   Cheddar cheese   205 mg/1 oz.   Navy beans, cooked   79 mg/1/2 cup   Kale   90 mg/1/2 cup   Ice cream strawberry   79 mg/1/2 cup           Orange, navel   56 mg/1 medium   Note: Calcium levels may vary depending on brand and size.  Daily calcium needs  14 to 18  years old: 1,300 mg  19 to 30 years old: 1,000 mg  31 to 50 years old: 1,000 mg  51 to 70 years old, women: 1,200 mg  51 to 70 years old, men: 1,000 mg  Pregnant or nursin to 18 years old: 1,300 mg, 19 to 50 years old: 1,000 mg  Older than 70 (women and men): 1,200 mg   Date Last Reviewed: 2018-2018 [employer]. [address] [place] [address]. All rights reserved. This information is not intended as a substitute for professional medical care. Always follow your healthcare professional's instructions.          Vitamin D  Does this test have other names?  25-hydroxyvitamin D (25-high-DROX-ee-VIE-tuh-min D), 25(OH)D  What is this test?  Vitamin D is mainly found in fortified dairy foods, juice, breakfast cereal, and certain fish. This vitamin plays many roles in the body. But because it helps the body absorb calcium from foods and supplements, it's particularly important for bone health. Vitamin D has many additional roles in the body.  Vitamin D comes in several forms. When ultraviolet light, such as sunlight, hits your skin, it creates vitamin D3. D2 is used to fortify dairy foods. Both of these are further processed by your liver and kidneys into a form your body can use. Most tests for vitamin D check the level of a form circulating in the body called 25-hydroxyvitamin D, also called 25(OH)D.   Why do I need this test?  You may need this test if your healthcare provider wants to check your vitamin D levels to find out if you have any risks to bone health. These might be:  Low calcium  Soft bones caused by low vitamin D or problems using it (osteomalacia)  Osteopenia  Osteoporosis  Rickets, in children  You may also need this test if you are at risk for low vitamin D levels. Risks include:  Being an older adult  Having difficulty absorbing fat from your diet  Having chronic kidney disease  Have dark skin pigmentation  Being a  baby  Vitamin D has many effects in the  body. You may need this test to help your healthcare provider diagnose or treat:  Problems with the parathyroid gland  Cancer  Autoimmune diseases, such as multiple sclerosis and Crohn's disease  Psoriasis  Asthma  Weakness or falls    What other tests might I have along with this test?  A healthcare provider may also want to check your parathyroid hormone levels and your calcium levels.   What do my test results mean?  Test results may vary depending on your age, gender, health history, the method used for the test, and other things. Your test results may not mean you have a problem. Ask your healthcare provider what your test results mean for you.   Children and adults need more than 30 nanograms per milliliter (ng/ml) of vitamin D. The optimal level of 25(OH)D is usually between 30 and 60 ng/mL. Recommended daily amounts range from 400 to 800 international units (IU) per day based on your age.  Levels lower than normal can mean you are:  Not making enough vitamin D on your own  Not getting enough vitamin D in your diet  Not absorbing vitamin D from your food as you should  Lower levels may also mean that your body is not converting the vitamin as it should. This might be because of kidney or liver disease.  Above-normal levels may be a sign that you're taking too much in supplement form.   How is this test done?  The test is done with a blood sample. A needle is used to draw blood from a vein in your arm or hand.   Does this test pose any risks?  Having a blood test with a needle carries some risks. These include bleeding, infection, bruising, and feeling lightheaded. When the needle pricks your arm or hand, you may feel a slight sting or pain. Afterward, the site may be sore.   What might affect my test results?  The amount of time you spend in the sunlight, your diet, and whether you take vitamin D in supplement form can affect your vitamin D levels. Ask your healthcare provider if any health conditions you  have or medicines you take could affect your results.  How do I get ready for this test?  Tell your healthcare provider if you take vitamin D supplements. Be sure your healthcare provider knows about all medicines, herbs, vitamins, and supplements you are taking. This includes medicines that don't need a prescription and any illicit drugs you may use.   © 8172-9638 [employer]. [address] [place] [address]. All rights reserved. This information is not intended as a substitute for professional medical care. Always follow your healthcare professional's instructions.          Preventing Osteoporosis: Avoiding Bone Loss  Certain factors can speed up bone loss or decrease bone growth. For example, alcohol, cigarettes, and certain medicines reduce bone mass. Some foods make it hard for your body to absorb calcium.    Things to avoid  Here are things to avoid to help prevent osteoporosis:  Alcohol. This is toxic to bones. It is a major cause of bone loss. Heavy drinking can cause osteoporosis even if you have no other risk factors.  Smoking. This reduces bone mass. Smoking may also interfere with estrogen levels and cause early menopause.  Inactivity. Not being active makes your bones lose strength and become thinner. Over time, thin bones may break. Women who aren't active are at a high risk for osteoporosis.  Certain medicines. Some medicines, such as cortisone, increase bone loss. They also decrease bone growth. Ask your healthcare provider about any side effects of your medicines, and how to prevent them.  Protein-rich or salty foods. Eaten in large amounts, these foods may deplete calcium.  Caffeine. This increases calcium loss. People who drink a lot of coffee, tea, or soda lose more calcium than those who don't.  Date Last Reviewed: 5/1/2018  © 9159-9281 [employer]. [address] [place] [address]. All rights reserved. This information is not intended as a  substitute for professional medical care. Always follow your healthcare professional's instructions.          Living with Osteoporosis: Regular Exercise  If you have osteoporosis, exercise is vital for your health. It can prevent bone fractures and spine changes. It will slow bone loss. Exercise will strengthen your body. It can also be fun. A variety of exercises is best. See below for exercises that can help you. But before you start, talk with your healthcare provider to be sure these exercises are right for you.    Resistance exercises. These build muscle strength and maintain bone mass. They also make you less prone to injury. Exercises include lifting small weights, doing push-ups and sit-ups, using elastic exercise bands, and using weight machines.  Weight-bearing activities. These help your whole body. They also help you maintain bone mass. Activities include walking, dancing, and housework.  Non-weight-bearing exercises. These help prevent back strain and pain. They do this by building the trunk and leg muscles. Exercises that help with flexibility can prevent falls. Examples include swimming, water exercise, and stretching.  Staying safe  Here are tips to stay safe:   Always check with your healthcare provider before starting any new exercise program.  Use weights only as instructed.  Stop any exercise that causes pain.   Date Last Reviewed: 5/1/2018  © 5385-3091 [employer]. [address] [place] [address], [place] [address]. All rights reserved. This information is not intended as a substitute for professional medical care. Always follow your healthcare professional's instructions.

## 2025-06-07 ENCOUNTER — HOSPITAL ENCOUNTER (OUTPATIENT)
Dept: MAMMOGRAPHY | Facility: HOSPITAL | Age: 55
Discharge: HOME | End: 2025-06-07
Attending: FAMILY MEDICINE
Payer: COMMERCIAL

## 2025-06-07 DIAGNOSIS — Z12.31 VISIT FOR SCREENING MAMMOGRAM: ICD-10-CM

## 2025-06-07 PROCEDURE — 77067 SCR MAMMO BI INCL CAD: CPT | Performed by: FAMILY MEDICINE

## 2025-06-07 PROCEDURE — 77063 BREAST TOMOSYNTHESIS BI: CPT | Performed by: FAMILY MEDICINE

## 2025-07-18 LAB
ALBUMIN/GLOBULIN RATIO: 1.3 (CALC) (ref 1–2.5)
ALBUMIN: 4.3 G/DL (ref 3.6–5.1)
ALKALINE PHOSPHATASE: 98 U/L (ref 37–153)
ALT: 41 U/L (ref 6–29)
AST: 26 U/L (ref 10–35)
BILIRUBIN, TOTAL: 0.6 MG/DL (ref 0.2–1.2)
BUN: 15 MG/DL (ref 7–25)
CALCIUM: 9.4 MG/DL (ref 8.6–10.4)
CARBON DIOXIDE: 25 MMOL/L (ref 20–32)
CHLORIDE: 102 MMOL/L (ref 98–110)
CHOL/HDLC RATIO: 4.8 (CALC)
CHOLESTEROL, TOTAL: 260 MG/DL
CREATININE: 0.75 MG/DL (ref 0.5–1.03)
EGFR: 93 ML/MIN/1.73M2
GLOBULIN: 3.4 G/DL (CALC) (ref 1.9–3.7)
GLUCOSE: 128 MG/DL (ref 65–99)
HDL CHOLESTEROL: 54 MG/DL
HEMOGLOBIN A1C: 6.8 %
LDL-CHOLESTEROL: 166 MG/DL (CALC)
NON-HDL CHOLESTEROL: 206 MG/DL (CALC)
POTASSIUM: 4 MMOL/L (ref 3.5–5.3)
PROTEIN, TOTAL: 7.7 G/DL (ref 6.1–8.1)
SODIUM: 139 MMOL/L (ref 135–146)
TRIGLYCERIDES: 236 MG/DL
TSH W/REFLEX TO FT4: 1.25 MIU/L (ref 0.4–4.5)

## 2025-07-24 ENCOUNTER — OFFICE VISIT (OUTPATIENT)
Dept: FAMILY MEDICINE CLINIC | Facility: CLINIC | Age: 55
End: 2025-07-24
Payer: COMMERCIAL

## 2025-07-24 VITALS
SYSTOLIC BLOOD PRESSURE: 116 MMHG | RESPIRATION RATE: 16 BRPM | TEMPERATURE: 98 F | HEART RATE: 82 BPM | BODY MASS INDEX: 29.22 KG/M2 | OXYGEN SATURATION: 95 % | WEIGHT: 139.19 LBS | DIASTOLIC BLOOD PRESSURE: 70 MMHG | HEIGHT: 58 IN

## 2025-07-24 DIAGNOSIS — Z87.891 HISTORY OF TOBACCO ABUSE: ICD-10-CM

## 2025-07-24 DIAGNOSIS — E03.9 ACQUIRED HYPOTHYROIDISM: ICD-10-CM

## 2025-07-24 DIAGNOSIS — Z12.4 SCREENING FOR CERVICAL CANCER: ICD-10-CM

## 2025-07-24 DIAGNOSIS — E78.2 MIXED HYPERLIPIDEMIA: ICD-10-CM

## 2025-07-24 DIAGNOSIS — Z12.2 SCREENING FOR LUNG CANCER: ICD-10-CM

## 2025-07-24 DIAGNOSIS — L29.9 PRURITUS: ICD-10-CM

## 2025-07-24 DIAGNOSIS — E11.9 NEW ONSET TYPE 2 DIABETES MELLITUS (HCC): Primary | ICD-10-CM

## 2025-07-24 PROBLEM — Z79.4 TYPE 2 DIABETES MELLITUS WITHOUT COMPLICATION, WITH LONG-TERM CURRENT USE OF INSULIN (HCC): Status: ACTIVE | Noted: 2025-07-24

## 2025-07-24 LAB
CREAT UR-SCNC: 122.5 MG/DL
MICROALBUMIN UR-MCNC: 0.3 MG/DL
MICROALBUMIN/CREAT 24H UR-RTO: 2.4 UG/MG (ref ?–30)

## 2025-07-24 PROCEDURE — 87624 HPV HI-RISK TYP POOLED RSLT: CPT | Performed by: FAMILY MEDICINE

## 2025-07-24 PROCEDURE — 82043 UR ALBUMIN QUANTITATIVE: CPT | Performed by: FAMILY MEDICINE

## 2025-07-24 PROCEDURE — 99214 OFFICE O/P EST MOD 30 MIN: CPT | Performed by: FAMILY MEDICINE

## 2025-07-24 PROCEDURE — 88175 CYTOPATH C/V AUTO FLUID REDO: CPT | Performed by: FAMILY MEDICINE

## 2025-07-24 PROCEDURE — 82570 ASSAY OF URINE CREATININE: CPT | Performed by: FAMILY MEDICINE

## 2025-07-24 RX ORDER — ROSUVASTATIN CALCIUM 20 MG/1
20 TABLET, COATED ORAL NIGHTLY
Qty: 90 TABLET | Refills: 1 | Status: SHIPPED | OUTPATIENT
Start: 2025-07-24

## 2025-07-24 RX ORDER — ROSUVASTATIN CALCIUM 20 MG/1
20 TABLET, COATED ORAL NIGHTLY
Qty: 90 TABLET | Refills: 0 | Status: SHIPPED | OUTPATIENT
Start: 2025-07-24 | End: 2025-07-24

## 2025-07-24 RX ORDER — METFORMIN HYDROCHLORIDE 750 MG/1
750 TABLET, EXTENDED RELEASE ORAL 2 TIMES DAILY WITH MEALS
Qty: 180 TABLET | Refills: 0 | Status: SHIPPED | OUTPATIENT
Start: 2025-07-24

## 2025-07-24 NOTE — PROGRESS NOTES
CHIEF COMPLAINT:   Chief Complaint   Patient presents with    Gyn Exam     Pap exam     Follow - Up     Discuss labs results          HPI:     Mely Rome is a 55 year old female presents for discuss labs and complete Pap    History of Present Illness  Mely Rome is a 55 year old female with prediabetes who presents for management of her condition and discuss recent labs and complete Pap smear.  Patient had refused to complete Pap and annual wellness.    She has been diagnosed with diabetes, confirmed through recent lab results. She is actively making dietary changes, such as reducing coffee and soda intake, and increasing water and V8 juice consumption. She is concerned about her weight, noting fluctuations between 133 and 139 pounds, and recognizes the need for weight loss.  She is not performing any regular exercise except for an occasional walk.    She experiences numbness and tingling in her feet, which she associates with her diabetes. She has been treating athlete's foot with Epsom salt soaks and apple vinegar, noting some improvement. She is vigilant about foot care due to the risk of ulcers and infections associated with diabetes.    Has history of hypothyroidism.  She denies missing doses of levothyroxine.  Last TSH 7/18/2025: 1.25 MIU per liter  TSH (mIU/mL)   Date Value   09/15/2022 1.450   08/03/2022 38.500 (H)     She has a history of smoking, having quit in 2019 after smoking a pack a day for about 20 years.  If CT lung screening confirms a nodule or potential cancer she is in good health to proceed with surgery and chemotherapy and treatment.  She denies any cough, chest tightness, shortness of breath or wheezing.  She denies any vaping.    She reports that she is post-menopausal and is not experiencing abnormal discharge or bleeding.     She has been experiencing  itching, for which she has previously used allergy medication to help control.    She is taking a multivitamin and has not been  screened for vitamin D deficiency.          Wt Readings from Last 6 Encounters:   07/24/25 139 lb 3.2 oz (63.1 kg)   05/29/25 140 lb (63.5 kg)   04/18/24 134 lb 3.2 oz (60.9 kg)   09/15/22 124 lb 9.6 oz (56.5 kg)   08/03/22 125 lb 6.4 oz (56.9 kg)   07/22/22 124 lb (56.2 kg)         HISTORY:  Past Medical History[1]   Past Surgical History[2]   Family History[3]   Social History: Short Social Hx on File[4]     Medications (Active prior to today's visit):  Current Medications[5]    Allergies:  Allergies[6]    PSFH elements reviewed from today and agreed except as otherwise stated in HPI.  PHYSICAL EXAM:   /70   Pulse 82   Temp 98.2 °F (36.8 °C) (Temporal)   Resp 16   Ht 4' 10\" (1.473 m)   Wt 139 lb 3.2 oz (63.1 kg)   SpO2 95%   BMI 29.09 kg/m²   Vital signs reviewed.Appears stated age, well groomed.  Physical Exam   GENERAL: well developed, well nourished,in no apparent distress  EYES; PERRLA, EOM-i B/L. Sclera clear and non icteric bilateral  SKIN: no rashes,no suspicious lesions  HEENT: atraumatic, normocephalic  NECK: supple,no adenopathy,no bruits, no JVD  LUNGS: clear to auscultation bilateral. No RRW. Good inspiratory and expiratory effort  CARDIO: RRR without murmur, clear S1, S2  VS: no carotid artery bruit bilateral.    GI: good BS's,no masses,No HSM or tenderness.   EXTREMITIES: no cyanosis, clubbing or edema, bilateral. No calf tenderness  Bilateral barefoot skin diabetic exam is normal, visualized feet and the appearance is normal.  Bilateral monofilament/sensation of both feet is normal.  Pulsation pedal pulse exam of both lower legs/feet is normal as well.  Psyche: normal affect.  Good eye contact.  Well-groomed.  No  tearfulness and crying.  Normal speech.  Appropriate insight.  Denies suicidal or homicidal ideation.    : normal perineum, scant vaginal discharge, normal cervix, thin prep performed, normal adnexa bilateral no masses or fullness or tenderness.  Uterus normal    LABS      Office Visit on 05/29/2025   Component Date Value    TSH W/REFLEX TO FT4 07/17/2025 1.25     HEMOGLOBIN A1c 07/17/2025 6.8 (H)     GLUCOSE 07/17/2025 128 (H)     UREA NITROGEN (BUN) 07/17/2025 15     CREATININE 07/17/2025 0.75     EGFR 07/17/2025 93     BUN/CREATININE RATIO 07/17/2025 SEE NOTE:     SODIUM 07/17/2025 139     POTASSIUM 07/17/2025 4.0     CHLORIDE 07/17/2025 102     CARBON DIOXIDE 07/17/2025 25     CALCIUM 07/17/2025 9.4     PROTEIN, TOTAL 07/17/2025 7.7     ALBUMIN 07/17/2025 4.3     GLOBULIN 07/17/2025 3.4     ALBUMIN/GLOBULIN RATIO 07/17/2025 1.3     BILIRUBIN, TOTAL 07/17/2025 0.6     ALKALINE PHOSPHATASE 07/17/2025 98     AST 07/17/2025 26     ALT 07/17/2025 41 (H)     CHOLESTEROL, TOTAL 07/17/2025 260 (H)     HDL CHOLESTEROL 07/17/2025 54     TRIGLYCERIDES 07/17/2025 236 (H)     LDL-CHOLESTEROL 07/17/2025 166 (H)     CHOL/HDLC RATIO 07/17/2025 4.8     NON-HDL CHOLESTEROL 07/17/2025 206 (H)       REVIEWED THIS VISIT  ASSESSMENT/PLAN:   55 year old female with    1. New onset type 2 diabetes mellitus (HCC)  - Start metFORMIN  MG Oral Tablet 24 Hr; Take 1 tablet (750 mg total) by mouth 2 (two) times daily with meals.  Dispense: 180 tablet; Refill: 0  - Ophthalmology Referral - In Network  - Microalb/Creat Ratio, Random Urine [E]  - Lipid Panel  - Comp Metabolic Panel (14)  - Hemoglobin A1C  - OP REFERRAL DIABETES FULL ED 10 HRS GROUP/IND  - Diabetic Test Strips and Supplies  Controlled  A1c less than 7.0  Start metformin  Risk, benefits and side effects of metformin discussed including abdominal bloating, nausea, flatulence, indigestion, loss of appetite, metallic taste, cramping, diarrhea, rare risk of lactic acidosis risk and hepatotoxicity.  Encouraged to 100 g or less of carbs daily  Encouraged regular exercise at least 30 minutes daily walking is acceptable-can be indoor or outdoor weather permitting or at the mall  Needs diabetic teaching for twice daily Accu-Cheks and low-carb  diet-referral to [facility] nutritionist given  Needs annual micro urine albumin  Needs annual eye exam  Immunizations-needs 1 more hepatitis B, recommend regular COVID vaccines since high risk with type 2 diabetes  Repeat labs and OV in 3 months    2. Mixed hyperlipidemia  - CT CALCIUM SCORING; Future  - rosuvastatin 20 MG Oral Tab; Take 1 tablet (20 mg total) by mouth nightly.  Dispense: 90 tablet; Refill: 1  - Lipid Panel  - Comp Metabolic Panel (14)  Risks and benefits of rosuvastatin discussed risk of hepatitis, muscle breakdown/rhabdomyolysis.  if joint pain or myalgias,or other side effects, stop medication immediately and call office.  Continue rosuvastatin  encourage mediterranean diet  Exercise brisk walk 30-60 mins at least 5 days weekly  Lose weight if overweight  Recheck fasting labs and OV in 3 months    3. Acquired hypothyroidism  Euthymic, TSH at goal  Continue levothyroxine  Needs repeat labs and OV annually for thyroid management    4. Pruritus  - Restart cetirizine 10 MG Oral Tab; Take 1 tablet (10 mg total) by mouth daily.  Dispense: 90 tablet; Refill: 3  No hives or rash on exam.  Patient did well with cetirizine and helped itching and dry skin previously    5. Screening for cervical cancer  - ThinPrep PAP Smear; Future  - Hpv Dna  High Risk , Thin Prep Collect; Future  - ThinPrep PAP Smear  - Hpv Dna  High Risk , Thin Prep Collect  - THINPREP PAP SMEAR ONLY    6. History of tobacco abuse  - CT CALCIUM SCORING; Future    7. Screening for lung cancer  - CT CALCIUM SCORING; Future      Meds This Visit:  Requested Prescriptions     Signed Prescriptions Disp Refills    metFORMIN  MG Oral Tablet 24 Hr 180 tablet 0     Sig: Take 1 tablet (750 mg total) by mouth 2 (two) times daily with meals.    rosuvastatin 20 MG Oral Tab 90 tablet 1     Sig: Take 1 tablet (20 mg total) by mouth nightly.       Health Maintenance:  Health Maintenance   Topic Date Due    Lung Cancer Screening  Never done     COVID-19 Vaccine (3 - 2024-25 season) 09/01/2024    Pap Smear  07/22/2025    Influenza Vaccine (1) 10/01/2025    Annual Physical  05/29/2026    Mammogram  06/07/2026    Colorectal Cancer Screening  07/22/2027    DTaP,Tdap,and Td Vaccines (2 - Td or Tdap) 07/22/2032    Annual Depression Screening  Completed    Pneumococcal Vaccine: 50+ Years  Completed    Zoster Vaccines  Completed    Meningococcal B Vaccine  Aged Out         Patient/Caregiver Education: Patient/Caregiver Education: There are no barriers to learning. Medical education done.   Outcome: Patient verbalizes understanding. Patient is notified to call with any questions, comp lications, allergies, or worsening or changing symptoms.  Patient is to call with any side effects or complications from the treatments as a result of today.     Problem List:   Problem List[7]    Imaging & Referrals:  None     7/24/2025  Vivian Mon, DO      Patient understands plan and follow-up.  Return in about 3 months (around 10/24/2025) for HTN,HL,DM, discuss labs, sooner if needed..            [1]   Past Medical History:   Anxiety    Depression    Hyperthyroidism   [2]   Past Surgical History:  Procedure Laterality Date    D & c      1993 elsa    [3]   Family History  Problem Relation Age of Onset    Mental Disorder Mother     Psychiatric Mother         Bipolar and schezophrenia    Dementia Father     No Known Problems Daughter     No Known Problems Son     No Known Problems Son     No Known Problems Son     No Known Problems Son     No Known Problems Maternal Grandmother     No Known Problems Maternal Grandfather     Breast Cancer Paternal Grandmother     No Known Problems Paternal Grandfather     Asthma Son    [4]   Social History  Socioeconomic History    Marital status: Single   Tobacco Use    Smoking status: Former     Current packs/day: 0.00     Average packs/day: 1 pack/day for 23.0 years (23.0 ttl pk-yrs)     Types: Cigarettes     Start date: 9/1/1998      Quit date: 9/1/2021     Years since quitting: 3.8    Smokeless tobacco: Never   Vaping Use    Vaping status: Never Used   Substance and Sexual Activity    Alcohol use: Not Currently     Comment: Casual drinker    Drug use: Never    Sexual activity: Not Currently     Partners: Male   Other Topics Concern    Caffeine Concern No    Exercise Yes     Comment: Want to start a workout    Seat Belt No    Special Diet No    Stress Concern Yes     Comment: Little bit of stress    Weight Concern Yes     Comment: Feel that im a little heavy   [5]   Current Outpatient Medications   Medication Sig Dispense Refill    hydrOXYzine 25 MG Oral Tab Take 1 tablet (25 mg total) by mouth every 8 (eight) hours as needed for Anxiety (causes sedation. no driving for 8 hr after ingestion). 60 tablet 0    levothyroxine 88 MCG Oral Tab Take 1 tablet (88 mcg total) by mouth before breakfast. 90 tablet 0    cetirizine 10 MG Oral Tab Take 1 tablet (10 mg total) by mouth daily. 90 tablet 3   [6] No Known Allergies  [7]   Patient Active Problem List  Diagnosis    Anxiety    History of gestational diabetes    BMI 28.0-28.9,adult    History of hyperthyroidism    Acquired hypothyroidism    Mixed hyperlipidemia    Prediabetes    History of anxiety    History of tobacco use    History of URI (upper respiratory infection)    Tinea pedis of both feet    Pruritus    Voice hoarseness

## 2025-07-24 NOTE — PROGRESS NOTES
The following individual(s) verbally consented to be recorded using ambient AI listening technology and understand that they can each withdraw their consent to this listening technology at any point by asking the clinician to turn off or pause the recording:    Patient name: Mely Rome  Additional names:  none

## 2025-07-25 DIAGNOSIS — L29.9 PRURITUS: ICD-10-CM

## 2025-07-25 LAB — HPV E6+E7 MRNA CVX QL NAA+PROBE: NEGATIVE

## 2025-07-25 RX ORDER — CETIRIZINE HYDROCHLORIDE 10 MG/1
10 TABLET ORAL DAILY
Qty: 90 TABLET | Refills: 3 | Status: SHIPPED | OUTPATIENT
Start: 2025-07-25

## 2025-07-26 RX ORDER — CETIRIZINE HYDROCHLORIDE 10 MG/1
10 TABLET ORAL DAILY
Qty: 90 TABLET | Refills: 3 | OUTPATIENT
Start: 2025-07-26

## 2025-07-26 NOTE — TELEPHONE ENCOUNTER
Refill passed per [facility] protocol.    Requested Prescriptions   Pending Prescriptions Disp Refills    CETIRIZINE 10 MG Oral Tab [Pharmacy Med Name: CETIRIZINE 10MG TABLETS] 90 tablet 3     Sig: TAKE 1 TABLET BY MOUTH DAILY       Allergy Medication Protocol Passed - 7/25/2025  8:44 PM        Passed - In person appointment or virtual visit in the past 12 mos or appointment in next 3 mos     Recent Outpatient Visits              Yesterday Type 2 diabetes mellitus without complication, without long-term current use of insulin ([facility])    [facility] [place]     Office Visit    1 month ago Annual physical exam    [facility], [place] Ja[place],     Office Visit    7 months ago Myxedema    [facility], [place] Lisa,     Telemedicine    1 year ago Annual physical exam    [facility], [place] Ja[place] Lisa,     Office Visit    2 years ago     [facility] [place]    Nurse Only          Future Appointments         Provider Department Appt Notes    In 3 weeks  CT CONSULT RM1;  CT MAIN 4 [facility] CT     In 2 months Vivian Mon DO [facility] [address], [place] Test results                    Passed - Medication is active on med list           Future Appointments         Provider Department Appt Notes    In 3 weeks  CT CONSULT RM1;  CT MAIN 4 [facility] CT     In 2 months Vivian Mon DO [facility], [address], [place] Test results          Recent Outpatient Visits              Yesterday Type 2 diabetes mellitus without complication, without long-term current use of insulin ([facility])    [facility], [place], DO    Office Visit    1 month ago Annual physical exam    [facility], [address], [place],  DO Vivian    Office Visit    7 months ago Myxedema    [facility]     [facility]    1 year ago Annual physical exam    [facility] [place], DO    Office Visit    2 years ago     [facility], [facility], [place]    Nurse Only

## 2025-07-28 PROBLEM — E11.9 NEW ONSET TYPE 2 DIABETES MELLITUS (HCC): Status: ACTIVE | Noted: 2025-07-24

## 2025-07-28 RX ORDER — CETIRIZINE HYDROCHLORIDE 10 MG/1
10 TABLET ORAL DAILY
Qty: 90 TABLET | Refills: 3 | Status: SHIPPED | OUTPATIENT
Start: 2025-07-28

## 2025-08-17 ENCOUNTER — ORDER TRANSCRIPTION (OUTPATIENT)
Dept: ADMINISTRATIVE | Facility: HOSPITAL | Age: 55
End: 2025-08-17

## 2025-08-17 DIAGNOSIS — Z13.6 SCREENING FOR CARDIOVASCULAR CONDITION: Primary | ICD-10-CM

## 2025-08-21 ENCOUNTER — HOSPITAL ENCOUNTER (OUTPATIENT)
Dept: CT IMAGING | Facility: HOSPITAL | Age: 55
Discharge: HOME | End: 2025-08-21
Attending: FAMILY MEDICINE

## 2025-08-21 VITALS
SYSTOLIC BLOOD PRESSURE: 104 MMHG | BODY MASS INDEX: 29.18 KG/M2 | HEIGHT: 58 IN | WEIGHT: 139 LBS | DIASTOLIC BLOOD PRESSURE: 60 MMHG

## 2025-08-21 DIAGNOSIS — Z13.6 SCREENING FOR CARDIOVASCULAR CONDITION: ICD-10-CM

## 2025-08-21 LAB
GLUCOSE POC: 104 MG/DL (ref 70–100)
HDL POC: 40 MG/DL (ref 40–60)
TRIGLYCERIDES POC: 114 MG/DL (ref 0–200)

## 2025-08-22 DIAGNOSIS — E78.2 MIXED HYPERLIPIDEMIA: ICD-10-CM

## 2025-08-22 DIAGNOSIS — E03.9 ACQUIRED HYPOTHYROIDISM: ICD-10-CM

## 2025-08-25 DIAGNOSIS — E03.9 ACQUIRED HYPOTHYROIDISM: ICD-10-CM

## 2025-08-26 DIAGNOSIS — E03.9 ACQUIRED HYPOTHYROIDISM: ICD-10-CM

## 2025-08-26 RX ORDER — LEVOTHYROXINE SODIUM 88 UG/1
88 TABLET ORAL
Qty: 90 TABLET | Refills: 0 | Status: CANCELLED | OUTPATIENT
Start: 2025-08-26 | End: 2026-08-21

## 2025-08-27 RX ORDER — ROSUVASTATIN CALCIUM 20 MG/1
20 TABLET, COATED ORAL NIGHTLY
Qty: 90 TABLET | Refills: 1 | OUTPATIENT
Start: 2025-08-27

## 2025-08-27 RX ORDER — LEVOTHYROXINE SODIUM 88 UG/1
88 TABLET ORAL
Qty: 90 TABLET | Refills: 3 | Status: SHIPPED | OUTPATIENT
Start: 2025-08-27

## 2025-08-27 RX ORDER — LEVOTHYROXINE SODIUM 88 UG/1
88 TABLET ORAL
Qty: 90 TABLET | Refills: 0 | Status: CANCELLED | OUTPATIENT
Start: 2025-08-27 | End: 2026-08-22

## (undated) NOTE — LETTER
Date: 1/17/2022    Patient Name: Ayah Schmitz          To Whom it may concern: This letter has been written at the patient's request. The above patient was seen for treatment of a medical condition.     This patient should be excused from attending wo

## (undated) NOTE — LETTER
Date: 6/23/2022     Patient Name: Jada Tabares          To Whom it may concern: This letter has been written at the patient's request. The above patient was seen at the [facility] for treatment of a medical condition. The patient may return to work on 6/25/2022.       Sincerely,    Irish Rangel DO

## (undated) NOTE — LETTER
Date: 1/25/2022    Patient Name: Suzie Sutherland          To Whom it may concern: This letter has been written at the patient's request. The above patient was seen at the [facility] for treatment of a medical condition.     The patient may

## (undated) NOTE — LETTER
Date: 8/3/2022    Patient Name: Soila Villegas          To Whom it may concern: This letter has been written at the patient's request. The above patient was seen at the [facility] for treatment of a medical condition. This patient should be excused from attending work from 08/01/2022 through 08/03/2022. The patient may return to work on 08/05/2022 with the following limitations: none.         Sincerely,      Mona Rangel MD

## (undated) NOTE — LETTER
Date: 1/25/2022    Patient Name: Brandi Nowak          To Whom it may concern: This letter has been written at the patient's request. The above patient was seen at the [facility] for treatment of a medical condition.     The patient may

## (undated) NOTE — LETTER
Date: 9/27/2022    Patient Name: Leana Deleon          To Whom it may concern: This letter has been written at the patient's request. The above patient was seen at the [facility] for treatment of a medical condition.     This patient should be excused from attending work until 10/03/2022      Sincerely,      Bucky Lamas DO

## (undated) NOTE — LETTER
Date: 1/13/2022     Patient Name: Candi Herrera          To Whom it may concern: This letter has been written at the patient's request. The above patient was seen at the [facility] for treatment of a medical condition.     This patient sh

## (undated) NOTE — LETTER
Date: 9/27/2022    Patient Name: Ayah Schmitz          To Whom it may concern: This letter has been written at the patient's request. The above patient was seen at the [facility] for treatment of a medical condition. This patient should be excused from attending work until 9/30/2022.       Sincerely,      Clarke Mathias DO